# Patient Record
Sex: FEMALE | Race: WHITE | NOT HISPANIC OR LATINO | URBAN - METROPOLITAN AREA
[De-identification: names, ages, dates, MRNs, and addresses within clinical notes are randomized per-mention and may not be internally consistent; named-entity substitution may affect disease eponyms.]

---

## 2018-01-02 ENCOUNTER — EMERGENCY (EMERGENCY)
Facility: HOSPITAL | Age: 78
LOS: 1 days | Discharge: HOME | End: 2018-01-02
Attending: EMERGENCY MEDICINE | Admitting: EMERGENCY MEDICINE
Payer: MEDICARE

## 2018-01-02 VITALS
TEMPERATURE: 98 F | RESPIRATION RATE: 16 BRPM | OXYGEN SATURATION: 98 % | SYSTOLIC BLOOD PRESSURE: 137 MMHG | DIASTOLIC BLOOD PRESSURE: 66 MMHG | WEIGHT: 147.05 LBS | HEIGHT: 66 IN | HEART RATE: 65 BPM

## 2018-01-02 DIAGNOSIS — Z79.2 LONG TERM (CURRENT) USE OF ANTIBIOTICS: ICD-10-CM
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
## 2018-01-02 DIAGNOSIS — S51.851A OPEN BITE OF RIGHT FOREARM, INITIAL ENCOUNTER: ICD-10-CM

## 2018-01-02 DIAGNOSIS — L03.113 CELLULITIS OF RIGHT UPPER LIMB: ICD-10-CM

## 2018-01-02 PROCEDURE — 99284 EMERGENCY DEPT VISIT MOD MDM: CPT

## 2018-01-02 RX ORDER — TETANUS TOXOID, REDUCED DIPHTHERIA TOXOID AND ACELLULAR PERTUSSIS VACCINE, ADSORBED 5; 2.5; 8; 8; 2.5 [IU]/.5ML; [IU]/.5ML; UG/.5ML; UG/.5ML; UG/.5ML
0.5 SUSPENSION INTRAMUSCULAR ONCE
Qty: 0 | Refills: 0 | Status: COMPLETED | OUTPATIENT
Start: 2018-01-02 | End: 2018-01-02

## 2018-01-02 RX ADMIN — TETANUS TOXOID, REDUCED DIPHTHERIA TOXOID AND ACELLULAR PERTUSSIS VACCINE, ADSORBED 0.5 MILLILITER(S): 5; 2.5; 8; 8; 2.5 SUSPENSION INTRAMUSCULAR at 19:03

## 2018-01-02 RX ADMIN — Medication 1 TABLET(S): at 19:03

## 2018-01-02 NOTE — ED PROVIDER NOTE - MEDICAL DECISION MAKING DETAILS
bedside US shows no abscess, no fb noted, healthy dog, will update tdap, augmentin, instructed pt to return in 48 hr for repeat check and return precautions given

## 2018-01-02 NOTE — ED PROVIDER NOTE - OBJECTIVE STATEMENT
77 yof pw dog bite to R forearm 5 days ago, healthy dog (daughter's), tdap unknown, noted redness around bite site intermittently fluctuating in size but no fc.  no other complaints.

## 2018-01-02 NOTE — ED ADULT TRIAGE NOTE - CHIEF COMPLAINT QUOTE
Pt states she was bitten by her daughters dog 5 days ago to right upper arm, states the bite is red and swollen

## 2018-01-02 NOTE — ED PROVIDER NOTE - PHYSICAL EXAMINATION
CON: ao x 3, HENMT: clear oropharynx, soft neck, HEAD: atraumatic, SKIN: noted puncture wound to ulnar aspect of R forearm, w/ surrounding erythema w/o lymphatic streaking, MSK: no deformities, NEURO: no gross motor or sensory deficit CON: ao x 3, HENMT: clear oropharynx, soft neck, HEAD: atraumatic, SKIN: noted puncture wound to ulnar aspect of R forearm, w/ surrounding erythema w/o lymphatic streaking, no induration or fluctuance, no drainage or bleeding, MSK: no deformities, no crepitus, NEURO: no gross motor or sensory deficit

## 2018-08-14 ENCOUNTER — EMERGENCY (EMERGENCY)
Facility: HOSPITAL | Age: 78
LOS: 1 days | Discharge: HOME | End: 2018-08-14
Admitting: EMERGENCY MEDICINE
Payer: MEDICARE

## 2018-08-14 VITALS
HEART RATE: 67 BPM | RESPIRATION RATE: 18 BRPM | TEMPERATURE: 98 F | SYSTOLIC BLOOD PRESSURE: 128 MMHG | DIASTOLIC BLOOD PRESSURE: 73 MMHG | OXYGEN SATURATION: 99 %

## 2018-08-14 DIAGNOSIS — Z79.1 LONG TERM (CURRENT) USE OF NON-STEROIDAL ANTI-INFLAMMATORIES (NSAID): ICD-10-CM

## 2018-08-14 DIAGNOSIS — M25.511 PAIN IN RIGHT SHOULDER: ICD-10-CM

## 2018-08-14 PROCEDURE — 73010 X-RAY EXAM OF SHOULDER BLADE: CPT | Mod: 26,RT

## 2018-08-14 PROCEDURE — 99283 EMERGENCY DEPT VISIT LOW MDM: CPT

## 2018-08-14 PROCEDURE — 73030 X-RAY EXAM OF SHOULDER: CPT | Mod: 26,RT

## 2018-08-14 RX ORDER — METHOCARBAMOL 500 MG/1
1000 TABLET, FILM COATED ORAL ONCE
Qty: 0 | Refills: 0 | Status: COMPLETED | OUTPATIENT
Start: 2018-08-14 | End: 2018-08-14

## 2018-08-14 RX ORDER — METHOCARBAMOL 500 MG/1
2 TABLET, FILM COATED ORAL
Qty: 18 | Refills: 0 | OUTPATIENT
Start: 2018-08-14 | End: 2018-08-16

## 2018-08-14 RX ADMIN — METHOCARBAMOL 1000 MILLIGRAM(S): 500 TABLET, FILM COATED ORAL at 15:34

## 2018-08-14 NOTE — ED ADULT NURSE NOTE - NSIMPLEMENTINTERV_GEN_ALL_ED
Implemented All Universal Safety Interventions:  [place] to call system. Call bell, personal items and telephone within reach. Instruct patient to call for assistance. Room bathroom lighting operational. Non-slip footwear when patient is off stretcher. Physically safe environment: no spills, clutter or unnecessary equipment. Stretcher in lowest position, wheels locked, appropriate side rails in place.

## 2018-08-14 NOTE — ED PROVIDER NOTE - PROGRESS NOTE DETAILS
rechecked pt - discussed negative XR. pt feeling improved after robaxin. pt has sling at home to use for comfort.

## 2018-08-14 NOTE — ED PROVIDER NOTE - MEDICAL DECISION MAKING DETAILS
78yo F presents with right shoulder pain after a pulling injury. exam significant for some rhomboid muscle tenderness and spasm. no mehran tenderness. xr scapula and shoulder negative. pain improved with robaxin. will d/c on robaxin.

## 2018-08-14 NOTE — ED PROVIDER NOTE - MUSCULOSKELETAL, MLM
Spine appears normal, range of motion is not limited. point tenderness to right rhomboid muscle, no mehran scapular tenderness. normal ROM of entire upper extremity.

## 2018-08-14 NOTE — ED PROVIDER NOTE - OBJECTIVE STATEMENT
76yo F without any medical problems presents for right shoulder pain after pulling weeds out of a pond with a rake. pt describes pain as positional, denies any fall or trauma. denies any numbness, tingling, weakness. attempted to treat with massage but it was too painful. has also been taking ibuprofen and using a heating pad without consistent relief.

## 2018-09-01 NOTE — ED PROVIDER NOTE - NS ED ATTENDING NAME FT
Prep Survey      Responses   Facility patient discharged from?  [facility]   Is patient eligible?  Yes   Discharge diagnosis  pneumonia   Does the patient have one of the following disease processes/diagnoses(primary or secondary)?  COPD/Pneumonia   Does the patient have Home health ordered?  No   Is there a DME ordered?  No   Comments regarding appointments  patient to call office   Prep survey completed?  Yes          Brianna Chavez RN        
Yao

## 2018-09-05 ENCOUNTER — INPATIENT (INPATIENT)
Facility: HOSPITAL | Age: 78
LOS: 1 days | Discharge: HOME | DRG: 149 | End: 2018-09-07
Attending: PSYCHIATRY & NEUROLOGY | Admitting: PSYCHIATRY & NEUROLOGY
Payer: MEDICARE

## 2018-09-05 VITALS
DIASTOLIC BLOOD PRESSURE: 75 MMHG | HEART RATE: 75 BPM | RESPIRATION RATE: 18 BRPM | SYSTOLIC BLOOD PRESSURE: 168 MMHG | TEMPERATURE: 99 F | OXYGEN SATURATION: 99 %

## 2018-09-05 LAB
ALBUMIN SERPL ELPH-MCNC: 3.9 G/DL — SIGNIFICANT CHANGE UP (ref 3.4–5)
ALP SERPL-CCNC: 56 U/L — SIGNIFICANT CHANGE UP (ref 40–120)
ALT FLD-CCNC: 21 U/L — SIGNIFICANT CHANGE UP (ref 12–42)
AMPHET UR-MCNC: NEGATIVE — SIGNIFICANT CHANGE UP
ANION GAP SERPL CALC-SCNC: 11 MMOL/L — SIGNIFICANT CHANGE UP (ref 9–16)
APPEARANCE UR: CLEAR — SIGNIFICANT CHANGE UP
AST SERPL-CCNC: 22 U/L — SIGNIFICANT CHANGE UP (ref 15–37)
BACTERIA # UR AUTO: SIGNIFICANT CHANGE UP /HPF
BARBITURATES UR SCN-MCNC: NEGATIVE — SIGNIFICANT CHANGE UP
BENZODIAZ UR-MCNC: NEGATIVE — SIGNIFICANT CHANGE UP
BILIRUB SERPL-MCNC: 0.7 MG/DL — SIGNIFICANT CHANGE UP (ref 0.2–1.2)
BILIRUB UR-MCNC: NEGATIVE — SIGNIFICANT CHANGE UP
BUN SERPL-MCNC: 14 MG/DL — SIGNIFICANT CHANGE UP (ref 7–23)
CALCIUM SERPL-MCNC: 8.5 MG/DL — SIGNIFICANT CHANGE UP (ref 8.5–10.5)
CHLORIDE SERPL-SCNC: 103 MMOL/L — SIGNIFICANT CHANGE UP (ref 96–108)
CK MB BLD-MCNC: 2.31 % — SIGNIFICANT CHANGE UP
CK MB CFR SERPL CALC: 2.4 NG/ML — SIGNIFICANT CHANGE UP (ref 0.5–3.6)
CK SERPL-CCNC: 104 U/L — SIGNIFICANT CHANGE UP (ref 26–192)
CO2 SERPL-SCNC: 23 MMOL/L — SIGNIFICANT CHANGE UP (ref 22–31)
COCAINE METAB.OTHER UR-MCNC: NEGATIVE — SIGNIFICANT CHANGE UP
COLOR SPEC: YELLOW — SIGNIFICANT CHANGE UP
CREAT SERPL-MCNC: 0.62 MG/DL — SIGNIFICANT CHANGE UP (ref 0.5–1.3)
DIFF PNL FLD: ABNORMAL
EPI CELLS # UR: SIGNIFICANT CHANGE UP /HPF
ETHANOL SERPL-MCNC: <3 MG/DL — SIGNIFICANT CHANGE UP
GLUCOSE SERPL-MCNC: 128 MG/DL — HIGH (ref 70–99)
GLUCOSE UR QL: NEGATIVE — SIGNIFICANT CHANGE UP
HCT VFR BLD CALC: 41.4 % — SIGNIFICANT CHANGE UP (ref 34.5–45)
HGB BLD-MCNC: 14.3 G/DL — SIGNIFICANT CHANGE UP (ref 11.5–15.5)
KETONES UR-MCNC: 40 MG/DL
LEUKOCYTE ESTERASE UR-ACNC: NEGATIVE — SIGNIFICANT CHANGE UP
LIDOCAIN IGE QN: 83 U/L — SIGNIFICANT CHANGE UP (ref 73–393)
LYMPHOCYTES # BLD AUTO: 11 % — LOW (ref 13–44)
MAGNESIUM SERPL-MCNC: 2 MG/DL — SIGNIFICANT CHANGE UP (ref 1.6–2.6)
MCHC RBC-ENTMCNC: 31.6 PG — SIGNIFICANT CHANGE UP (ref 27–34)
MCHC RBC-ENTMCNC: 34.5 G/DL — SIGNIFICANT CHANGE UP (ref 32–36)
MCV RBC AUTO: 91.6 FL — SIGNIFICANT CHANGE UP (ref 80–100)
METHADONE UR-MCNC: NEGATIVE — SIGNIFICANT CHANGE UP
MONOCYTES NFR BLD AUTO: 2 % — SIGNIFICANT CHANGE UP (ref 2–14)
NEUTROPHILS NFR BLD AUTO: 80 % — HIGH (ref 43–77)
NEUTS BAND # BLD: 5 % — SIGNIFICANT CHANGE UP
NITRITE UR-MCNC: NEGATIVE — SIGNIFICANT CHANGE UP
OPIATES UR-MCNC: NEGATIVE — SIGNIFICANT CHANGE UP
PCP SPEC-MCNC: SIGNIFICANT CHANGE UP
PCP UR-MCNC: NEGATIVE — SIGNIFICANT CHANGE UP
PH UR: 6 — SIGNIFICANT CHANGE UP (ref 5–8)
PLAT MORPH BLD: NORMAL — SIGNIFICANT CHANGE UP
PLATELET # BLD AUTO: 202 K/UL — SIGNIFICANT CHANGE UP (ref 150–400)
POTASSIUM SERPL-MCNC: 3.8 MMOL/L — SIGNIFICANT CHANGE UP (ref 3.5–5.3)
POTASSIUM SERPL-SCNC: 3.8 MMOL/L — SIGNIFICANT CHANGE UP (ref 3.5–5.3)
PROT SERPL-MCNC: 7.5 G/DL — SIGNIFICANT CHANGE UP (ref 6.4–8.2)
PROT UR-MCNC: 30 MG/DL
RBC # BLD: 4.52 M/UL — SIGNIFICANT CHANGE UP (ref 3.8–5.2)
RBC # FLD: 12.1 % — SIGNIFICANT CHANGE UP (ref 10.3–16.9)
RBC BLD AUTO: NORMAL — SIGNIFICANT CHANGE UP
RBC CASTS # UR COMP ASSIST: < 5 /HPF — SIGNIFICANT CHANGE UP
SODIUM SERPL-SCNC: 137 MMOL/L — SIGNIFICANT CHANGE UP (ref 132–145)
SP GR SPEC: 1.02 — SIGNIFICANT CHANGE UP (ref 1–1.03)
THC UR QL: NEGATIVE — SIGNIFICANT CHANGE UP
TROPONIN I SERPL-MCNC: <0.017 NG/ML — LOW (ref 0.02–0.06)
UROBILINOGEN FLD QL: 0.2 E.U./DL — SIGNIFICANT CHANGE UP
VARIANT LYMPHS # BLD: 2 % — SIGNIFICANT CHANGE UP
WBC # BLD: 7.7 K/UL — SIGNIFICANT CHANGE UP (ref 3.8–10.5)
WBC # FLD AUTO: 7.7 K/UL — SIGNIFICANT CHANGE UP (ref 3.8–10.5)
WBC UR QL: < 5 /HPF — SIGNIFICANT CHANGE UP

## 2018-09-05 PROCEDURE — 70450 CT HEAD/BRAIN W/O DYE: CPT | Mod: 26

## 2018-09-05 PROCEDURE — 99285 EMERGENCY DEPT VISIT HI MDM: CPT | Mod: 25

## 2018-09-05 PROCEDURE — 93010 ELECTROCARDIOGRAM REPORT: CPT

## 2018-09-05 RX ORDER — DIAZEPAM 5 MG
5 TABLET ORAL ONCE
Qty: 0 | Refills: 0 | Status: DISCONTINUED | OUTPATIENT
Start: 2018-09-05 | End: 2018-09-05

## 2018-09-05 RX ORDER — SODIUM CHLORIDE 9 MG/ML
1000 INJECTION INTRAMUSCULAR; INTRAVENOUS; SUBCUTANEOUS ONCE
Qty: 0 | Refills: 0 | Status: COMPLETED | OUTPATIENT
Start: 2018-09-05 | End: 2018-09-05

## 2018-09-05 RX ORDER — FAMOTIDINE 10 MG/ML
20 INJECTION INTRAVENOUS ONCE
Qty: 0 | Refills: 0 | Status: COMPLETED | OUTPATIENT
Start: 2018-09-05 | End: 2018-09-05

## 2018-09-05 RX ORDER — MECLIZINE HCL 12.5 MG
25 TABLET ORAL ONCE
Qty: 0 | Refills: 0 | Status: COMPLETED | OUTPATIENT
Start: 2018-09-05 | End: 2018-09-05

## 2018-09-05 RX ORDER — MECLIZINE HCL 12.5 MG
50 TABLET ORAL ONCE
Qty: 0 | Refills: 0 | Status: COMPLETED | OUTPATIENT
Start: 2018-09-05 | End: 2018-09-05

## 2018-09-05 RX ORDER — ONDANSETRON 8 MG/1
4 TABLET, FILM COATED ORAL ONCE
Qty: 0 | Refills: 0 | Status: COMPLETED | OUTPATIENT
Start: 2018-09-05 | End: 2018-09-05

## 2018-09-05 RX ADMIN — Medication 25 MILLIGRAM(S): at 20:52

## 2018-09-05 RX ADMIN — Medication 5 MILLIGRAM(S): at 20:52

## 2018-09-05 RX ADMIN — Medication 50 MILLIGRAM(S): at 17:56

## 2018-09-05 RX ADMIN — ONDANSETRON 4 MILLIGRAM(S): 8 TABLET, FILM COATED ORAL at 17:56

## 2018-09-05 RX ADMIN — SODIUM CHLORIDE 2000 MILLILITER(S): 9 INJECTION INTRAMUSCULAR; INTRAVENOUS; SUBCUTANEOUS at 17:56

## 2018-09-05 RX ADMIN — FAMOTIDINE 20 MILLIGRAM(S): 10 INJECTION INTRAVENOUS at 17:56

## 2018-09-05 NOTE — ED ADULT NURSE NOTE - NSIMPLEMENTINTERV_GEN_ALL_ED
Implemented All Universal Safety Interventions:  [place] to call system. Call bell, personal items and telephone within reach. Instruct patient to call for assistance. Room bathroom lighting operational. Non-slip footwear when patient is off stretcher. Physically safe environment: no spills, clutter or unnecessary equipment. Stretcher in lowest position, wheels locked, appropriate side rails in place.

## 2018-09-05 NOTE — ED PROVIDER NOTE - PROGRESS NOTE DETAILS
improved symptoms, resolved vomiting but unsteady when gait attempted. will treat w more meclizine and will try valium and reassess. Pt still ataxic upon getting up, not improving, will admit for further work up of central dizziness, R/O cerebellar pathology. Discussed case w Dr Decker, accepts the case under Dr Britt.

## 2018-09-05 NOTE — ED ADULT NURSE REASSESSMENT NOTE - NS ED NURSE REASSESS COMMENT FT1
pt assisted to bathroom with wheelchair, reports dizziness when transferring to toilet, unsteady on feet, MD Pineda aware.
received pt from JESSIE Velez, pt resting in stretcher with daughter at bedside, reports improvement in dizziness.

## 2018-09-05 NOTE — ED PROVIDER NOTE - OBJECTIVE STATEMENT
77 y/o Female presents to the ED for a sudden dizziness sensation for approximately 14 hours PTA. Pt was sleeping and has been waking up throughout the night. Symptoms started abruptly, and feels better when she is not moving. But upon moving she feels dizzy and nauseated. She has vomited several times throughout the day. Denies CP, SOB, palpitations, headache, neck pain, fever, and chills. No similar prior episodes.

## 2018-09-05 NOTE — ED ADULT NURSE REASSESSMENT NOTE - NSIMPLEMENTINTERV_GEN_ALL_ED
Implemented All Fall Risk Interventions:  [place] to call system. Call bell, personal items and telephone within reach. Instruct patient to call for assistance. Room bathroom lighting operational. Non-slip footwear when patient is off stretcher. Physically safe environment: no spills, clutter or unnecessary equipment. Stretcher in lowest position, wheels locked, appropriate side rails in place. Provide visual cue, wrist band, yellow gown, etc. Monitor gait and stability. Monitor for mental status changes and reorient to person, place, and time. Review medications for side effects contributing to fall risk. Reinforce activity limits and safety measures with patient and family.

## 2018-09-05 NOTE — ED ADULT TRIAGE NOTE - CHIEF COMPLAINT QUOTE
pt. reports waking up with dizziness, nausea, and vomiting. Pt. unable to stand or move without loosing balance or feeling dizzy.

## 2018-09-05 NOTE — ED PROVIDER NOTE - MEDICAL DECISION MAKING DETAILS
Will tx as per vertigo but since this is patients first episode, will do blood work, CT head, hydrate, and reassess.

## 2018-09-06 DIAGNOSIS — Z29.9 ENCOUNTER FOR PROPHYLACTIC MEASURES, UNSPECIFIED: ICD-10-CM

## 2018-09-06 DIAGNOSIS — Z96.622 PRESENCE OF LEFT ARTIFICIAL ELBOW JOINT: Chronic | ICD-10-CM

## 2018-09-06 DIAGNOSIS — R63.8 OTHER SYMPTOMS AND SIGNS CONCERNING FOOD AND FLUID INTAKE: ICD-10-CM

## 2018-09-06 DIAGNOSIS — R42 DIZZINESS AND GIDDINESS: ICD-10-CM

## 2018-09-06 LAB
ANION GAP SERPL CALC-SCNC: 12 MMOL/L — SIGNIFICANT CHANGE UP (ref 5–17)
BUN SERPL-MCNC: 10 MG/DL — SIGNIFICANT CHANGE UP (ref 7–23)
CALCIUM SERPL-MCNC: 8.9 MG/DL — SIGNIFICANT CHANGE UP (ref 8.4–10.5)
CHLORIDE SERPL-SCNC: 103 MMOL/L — SIGNIFICANT CHANGE UP (ref 96–108)
CO2 SERPL-SCNC: 24 MMOL/L — SIGNIFICANT CHANGE UP (ref 22–31)
CREAT SERPL-MCNC: 0.56 MG/DL — SIGNIFICANT CHANGE UP (ref 0.5–1.3)
GLUCOSE SERPL-MCNC: 85 MG/DL — SIGNIFICANT CHANGE UP (ref 70–99)
HCT VFR BLD CALC: 40.4 % — SIGNIFICANT CHANGE UP (ref 34.5–45)
HGB BLD-MCNC: 13.6 G/DL — SIGNIFICANT CHANGE UP (ref 11.5–15.5)
MAGNESIUM SERPL-MCNC: 2 MG/DL — SIGNIFICANT CHANGE UP (ref 1.6–2.6)
MCHC RBC-ENTMCNC: 31.4 PG — SIGNIFICANT CHANGE UP (ref 27–34)
MCHC RBC-ENTMCNC: 33.7 G/DL — SIGNIFICANT CHANGE UP (ref 32–36)
MCV RBC AUTO: 93.3 FL — SIGNIFICANT CHANGE UP (ref 80–100)
PLATELET # BLD AUTO: 192 K/UL — SIGNIFICANT CHANGE UP (ref 150–400)
POTASSIUM SERPL-MCNC: 3.2 MMOL/L — LOW (ref 3.5–5.3)
POTASSIUM SERPL-SCNC: 3.2 MMOL/L — LOW (ref 3.5–5.3)
RBC # BLD: 4.33 M/UL — SIGNIFICANT CHANGE UP (ref 3.8–5.2)
RBC # FLD: 12.5 % — SIGNIFICANT CHANGE UP (ref 10.3–16.9)
SODIUM SERPL-SCNC: 139 MMOL/L — SIGNIFICANT CHANGE UP (ref 135–145)
WBC # BLD: 7.2 K/UL — SIGNIFICANT CHANGE UP (ref 3.8–10.5)
WBC # FLD AUTO: 7.2 K/UL — SIGNIFICANT CHANGE UP (ref 3.8–10.5)

## 2018-09-06 PROCEDURE — 99223 1ST HOSP IP/OBS HIGH 75: CPT

## 2018-09-06 PROCEDURE — 93010 ELECTROCARDIOGRAM REPORT: CPT

## 2018-09-06 RX ORDER — SODIUM CHLORIDE 9 MG/ML
1000 INJECTION INTRAMUSCULAR; INTRAVENOUS; SUBCUTANEOUS
Qty: 0 | Refills: 0 | Status: DISCONTINUED | OUTPATIENT
Start: 2018-09-06 | End: 2018-09-07

## 2018-09-06 RX ORDER — MECLIZINE HCL 12.5 MG
25 TABLET ORAL EVERY 6 HOURS
Qty: 0 | Refills: 0 | Status: DISCONTINUED | OUTPATIENT
Start: 2018-09-06 | End: 2018-09-07

## 2018-09-06 RX ORDER — INFLUENZA VIRUS VACCINE 15; 15; 15; 15 UG/.5ML; UG/.5ML; UG/.5ML; UG/.5ML
0.5 SUSPENSION INTRAMUSCULAR ONCE
Qty: 0 | Refills: 0 | Status: COMPLETED | OUTPATIENT
Start: 2018-09-06 | End: 2018-09-07

## 2018-09-06 RX ORDER — ASPIRIN/CALCIUM CARB/MAGNESIUM 324 MG
325 TABLET ORAL ONCE
Qty: 0 | Refills: 0 | Status: COMPLETED | OUTPATIENT
Start: 2018-09-06 | End: 2018-09-06

## 2018-09-06 RX ORDER — ONDANSETRON 8 MG/1
4 TABLET, FILM COATED ORAL EVERY 8 HOURS
Qty: 0 | Refills: 0 | Status: DISCONTINUED | OUTPATIENT
Start: 2018-09-06 | End: 2018-09-07

## 2018-09-06 RX ORDER — ASPIRIN/CALCIUM CARB/MAGNESIUM 324 MG
81 TABLET ORAL DAILY
Qty: 0 | Refills: 0 | Status: DISCONTINUED | OUTPATIENT
Start: 2018-09-07 | End: 2018-09-07

## 2018-09-06 RX ORDER — INFLUENZA VIRUS VACCINE 15; 15; 15; 15 UG/.5ML; UG/.5ML; UG/.5ML; UG/.5ML
0.5 SUSPENSION INTRAMUSCULAR ONCE
Qty: 0 | Refills: 0 | Status: DISCONTINUED | OUTPATIENT
Start: 2018-09-06 | End: 2018-09-06

## 2018-09-06 RX ORDER — ATORVASTATIN CALCIUM 80 MG/1
40 TABLET, FILM COATED ORAL AT BEDTIME
Qty: 0 | Refills: 0 | Status: DISCONTINUED | OUTPATIENT
Start: 2018-09-06 | End: 2018-09-07

## 2018-09-06 RX ADMIN — Medication 325 MILLIGRAM(S): at 02:51

## 2018-09-06 RX ADMIN — SODIUM CHLORIDE 100 MILLILITER(S): 9 INJECTION INTRAMUSCULAR; INTRAVENOUS; SUBCUTANEOUS at 02:51

## 2018-09-06 RX ADMIN — ATORVASTATIN CALCIUM 40 MILLIGRAM(S): 80 TABLET, FILM COATED ORAL at 22:10

## 2018-09-06 NOTE — H&P ADULT - PROBLEM SELECTOR PLAN 1
Likely positional vertigo. Cannot rule out central vestibular lesion given symptoms of ataxic gait and vomiting, however, less likely given that pt without headache, double vision, visual loss, slurred speech, or other cranial nerve abnormalities, motor or sensory deficits, dysmetria, or abnormal reflexes. Unlikely postural vertigo as symptoms not provoked by changes in BP. CTH negative.  - ordered for MRI and MRA head w/o contrast  - s/p ASA 325mg x1 and started on 81mg daily  - started on atorvastatin 40mg qhs  - NS at 100cc/hr  - Zofran 4mg q8hrs PRN nausea  - Meclizine 25mg q6hrs PRN dizziness  - fall precautions  - monitor neuro status per protocol

## 2018-09-06 NOTE — H&P ADULT - NSHPSOCIALHISTORY_GEN_ALL_CORE
Patient denies tobacco and ETOH use. Occasional ETOH use.   Previous smoker, 12 pack years, quit 15 years ago.  Retired.

## 2018-09-06 NOTE — H&P ADULT - NSHPPHYSICALEXAM_GEN_ALL_CORE
.  VITAL SIGNS:  T(F): 98 (09-06-18 @ 00:20), Max: 98.8 (09-05-18 @ 17:03)  HR: 64 (09-06-18 @ 01:07) (64 - 76)  BP: 147/66 (09-06-18 @ 01:07) (129/71 - 168/75)  BP(mean): 95 (09-06-18 @ 01:07) (95 - 95)  RR: 16 (09-06-18 @ 01:07) (16 - 18)  SpO2: 100% (09-06-18 @ 01:07) (98% - 100%)    PHYSICAL EXAM:  Constitutional: WDWN resting comfortably in bed; NAD  HEENT: NC/AT, PERRL, EOMI, anicteric sclera, no nasal discharge; uvula midline, no oropharyngeal erythema or exudates; MMM  Neck: supple; no JVD or thyromegaly  Respiratory: CTA B/L; no W/R/R, no retractions  Cardiac: +S1/S2; RRR; no M/R/G; PMI non-displaced  Gastrointestinal: soft, NT/ND; no rebound or guarding; +BSx4  Back: spine midline, no bony tenderness or step-offs; no CVAT B/L  Extremities: WWP, no clubbing or cyanosis; no peripheral edema  Musculoskeletal: NROM x4; no joint swelling, tenderness or erythema  Vascular: 2+ radial, femoral, DP/PT pulses B/L  Dermatologic: skin warm, dry and intact; no rashes, wounds, or scars  Lymphatic: no submandibular or cervical LAD  Neurologic: AAOx3; CNII-XII grossly intact; no focal deficits  Psychiatric: affect and characteristics of appearance, verbalizations, behaviors are appropriate, denies SI/HI/AH/VH .  VITAL SIGNS:  T(F): 98 (09-06-18 @ 00:20), Max: 98.8 (09-05-18 @ 17:03)  HR: 64 (09-06-18 @ 01:07) (64 - 76)  BP: 147/66 (09-06-18 @ 01:07) (129/71 - 168/75)  BP(mean): 95 (09-06-18 @ 01:07) (95 - 95)  RR: 16 (09-06-18 @ 01:07) (16 - 18)  SpO2: 100% (09-06-18 @ 01:07) (98% - 100%)    PHYSICAL EXAM:  Constitutional: WDWN, resting comfortably in bed, NAD  HEENT: NC/AT, PERRL, EOMI, no oropharyngeal erythema or exudates; MMM  Neck: supple  Respiratory: CTA B/L  Cardiac: +S1/S2; RRR; no M/R/G  Gastrointestinal: soft, NT/ND; no rebound or guarding; +BSx4  Extremities: L thigh with large ecchymosis and palpable hematoma  skin: no rashes  Neurologic:   - Mental Status:  AAOx3; speech is fluent with intact naming, repetition, and comprehension; Good overall fund of knowledge.  - Cranial Nerves II-XII:  PERRL, EOMI with left horizontal nystagmus, Facial sensation is intact in the V1-V3 distribution bilaterally,  Face is symmetric with normal eye closure and smile, Hearing is intact to finger rub, Uvula is midline and soft palate rises symmetrically, Head turning and shoulder shrug are intact, Tongue protrudes in the midline.  - Motor:  Strength is 5/5 throughout.  There is no pronator drift.  Normal muscle bulk and tone throughout.  - Reflexes:  2+ and symmetric throughout.  - Sensory:  Intact to light touch, and joint-position sense throughout.  - Coordination:  Finger-nose-finger and heel-knee-shin intact without dysmetria.  Rapid alternating hand movements intact.  - Gait:   deferred

## 2018-09-06 NOTE — H&P ADULT - PMH
Malignant neoplasm of female breast, unspecified estrogen receptor status, unspecified laterality, unspecified site of breast  In remission

## 2018-09-06 NOTE — H&P ADULT - NSHPLABSRESULTS_GEN_ALL_CORE
.  LABS:                         14.3   7.7   )-----------( 202      ( 05 Sep 2018 18:28 )             41.4         137  |  103  |  14  ----------------------------<  128<H>  3.8   |  23  |  0.62    Ca    8.5      05 Sep 2018 18:28  Mg     2.0         TPro  7.5  /  Alb  3.9  /  TBili  0.7  /  DBili  x   /  AST  22  /  ALT  21  /  AlkPhos  56        Urinalysis Basic - ( 05 Sep 2018 20:51 )  Color: Yellow / Appearance: Clear / S.025 / pH: x  Gluc: x / Ketone: 40 mg/dL  / Bili: NEGATIVE / Urobili: 0.2 E.U./dL   Blood: x / Protein: 30 mg/dL / Nitrite: NEGATIVE   Leuk Esterase: NEGATIVE / RBC: < 5 /HPF / WBC < 5 /HPF   Sq Epi: x / Non Sq Epi: Rare /HPF / Bacteria: Occasional /HPF      CARDIAC MARKERS ( 05 Sep 2018 18:28 )  <0.017 ng/mL / x     / 104 U/L / x     / 2.4 ng/mL        RADIOLOGY, EKG & ADDITIONAL TESTS: Reviewed. .  LABS:                         14.3   7.7   )-----------( 202      ( 05 Sep 2018 18:28 )             41.4     09-05    137  |  103  |  14  ----------------------------<  128<H>  3.8   |  23  |  0.62    Ca    8.5      05 Sep 2018 18:28  Mg     2.0     09-05    TPro  7.5  /  Alb  3.9  /  TBili  0.7  /  DBili  x   /  AST  22  /  ALT  21  /  AlkPhos  56  09-05      CARDIAC MARKERS ( 05 Sep 2018 18:28 )  <0.017 ng/mL / x     / 104 U/L / x     / 2.4 ng/mL        RADIOLOGY, EKG & ADDITIONAL TESTS: Reviewed.    CT Head No Cont (09.05.18 @ 18:06)     IMPRESSION: Minimal microvascular disease.  No acute intracranial hemorrhage, transcortical infarction, or mass   effect.

## 2018-09-06 NOTE — H&P ADULT - HISTORY OF PRESENT ILLNESS
78F with no significant PMH who presents to the [facility] ED from [facility] for 1 day history of dizziness. Dizziness started abruptly in the morning and is associated with nausea and vomiting. It is exacerbated by moving and alleviated by staying still. She denies CP, SOB, palpitations, headache, neck pain, fever, and chills. No similar prior episodes.    In [facility], vitals were T 98.8, HR 75, /75, RR 18, SpO2 98% room air. No significant abnormal labs. CTH with minimal microvascular disease, but with was negative for infarction, bleed or mass effect. Patient given meclizine 75mg total, Pepcid 20mg IV, Valium 5mg, Zofran 4mg and 1L NS bolus. Patient transferred to [facility] tele for r/o vestibular lesion. 78F with PMH of breast cancer s/p lumpectomy and RTx (now in remission) who presents from [facility] for 1 day history of dizziness. Dizziness started abruptly at 3am on 9/5 when patient awoke and sat up in bed with sudden urge to urinate. Dizziness was exacerbated by head movement and alleviated by resting and staying still. Described as feeling wobbly. It was associated with nausea and vomiting with approximately 20 episodes of NBNB vomiting throughout the day. Patient admits to eating rotisserie chicken that sat in the hot car for >1hr the day prior and 3 glasses of wine (more than normal). She denies associated CP, SOB, palpitations, headache, neck pain, fever, chills, hearing loss, ear pain, difficulty speaking, or swallowing. No similar prior episodes. Patient admits to minor trauma 1 week prior where she sustained large R thigh hematoma after mechanical fall while hiking, however, she denies head trauma at that time. Patient states that her symptoms are almost completely resolved after arrival at [facility].     In [facility], vitals were T 98.8, HR 75, /75, RR 18, SpO2 98% room air. No significant abnormal labs. CTH with minimal microvascular disease, but with was negative for infarction, bleed or mass effect. Patient given meclizine 75mg total, Pepcid 20mg IV, Valium 5mg, Zofran 4mg and 1L NS bolus. Patient transferred to [facility] for r/o vestibular lesion.

## 2018-09-06 NOTE — H&P ADULT - ATTENDING COMMENTS
Patient seen and examined.  Agree with above.  Patient had dizziness with vomiting that was worse with movement.  She didn't feel comfortable moving / walking in her house due to dizziness and repeated vomiting.  No specific weakness or numbness.  It started two nights ago.  She thinks that she did improve with the meds given to her at [facility] but still wasn't steady enough to go home.  She feels much better today but not fully back to baseline.      Neurological exam intact without nystagmus or dysmetria.    Overall seems more compatible with peripheral vertigo; lower suspicion for TIA/CVA.    Awaiting rest of workup including MRI Brain, PT eval.

## 2018-09-07 ENCOUNTER — TRANSCRIPTION ENCOUNTER (OUTPATIENT)
Age: 78
End: 2018-09-07

## 2018-09-07 VITALS — TEMPERATURE: 98 F

## 2018-09-07 PROCEDURE — 36415 COLL VENOUS BLD VENIPUNCTURE: CPT

## 2018-09-07 PROCEDURE — 82550 ASSAY OF CK (CPK): CPT

## 2018-09-07 PROCEDURE — 83735 ASSAY OF MAGNESIUM: CPT

## 2018-09-07 PROCEDURE — 99238 HOSP IP/OBS DSCHRG MGMT 30/<: CPT

## 2018-09-07 PROCEDURE — 85025 COMPLETE CBC W/AUTO DIFF WBC: CPT

## 2018-09-07 PROCEDURE — 84484 ASSAY OF TROPONIN QUANT: CPT

## 2018-09-07 PROCEDURE — 85027 COMPLETE CBC AUTOMATED: CPT

## 2018-09-07 PROCEDURE — 93005 ELECTROCARDIOGRAM TRACING: CPT

## 2018-09-07 PROCEDURE — 80307 DRUG TEST PRSMV CHEM ANLYZR: CPT

## 2018-09-07 PROCEDURE — 90662 IIV NO PRSV INCREASED AG IM: CPT

## 2018-09-07 PROCEDURE — 81001 URINALYSIS AUTO W/SCOPE: CPT

## 2018-09-07 PROCEDURE — 83690 ASSAY OF LIPASE: CPT

## 2018-09-07 PROCEDURE — 96374 THER/PROPH/DIAG INJ IV PUSH: CPT

## 2018-09-07 PROCEDURE — 80053 COMPREHEN METABOLIC PANEL: CPT

## 2018-09-07 PROCEDURE — 82962 GLUCOSE BLOOD TEST: CPT

## 2018-09-07 PROCEDURE — 82553 CREATINE MB FRACTION: CPT

## 2018-09-07 PROCEDURE — 99285 EMERGENCY DEPT VISIT HI MDM: CPT | Mod: 25

## 2018-09-07 PROCEDURE — 80048 BASIC METABOLIC PNL TOTAL CA: CPT

## 2018-09-07 PROCEDURE — 70450 CT HEAD/BRAIN W/O DYE: CPT

## 2018-09-07 PROCEDURE — 96375 TX/PRO/DX INJ NEW DRUG ADDON: CPT

## 2018-09-07 PROCEDURE — 97161 PT EVAL LOW COMPLEX 20 MIN: CPT

## 2018-09-07 RX ORDER — ATORVASTATIN CALCIUM 80 MG/1
1 TABLET, FILM COATED ORAL
Qty: 30 | Refills: 0 | OUTPATIENT
Start: 2018-09-07 | End: 2018-10-06

## 2018-09-07 RX ORDER — ASPIRIN/CALCIUM CARB/MAGNESIUM 324 MG
1 TABLET ORAL
Qty: 30 | Refills: 0 | OUTPATIENT
Start: 2018-09-07 | End: 2018-10-06

## 2018-09-07 RX ORDER — MECLIZINE HCL 12.5 MG
1 TABLET ORAL
Qty: 80 | Refills: 0 | OUTPATIENT
Start: 2018-09-07 | End: 2018-09-26

## 2018-09-07 RX ADMIN — INFLUENZA VIRUS VACCINE 0.5 MILLILITER(S): 15; 15; 15; 15 SUSPENSION INTRAMUSCULAR at 16:51

## 2018-09-07 RX ADMIN — Medication 25 MILLIGRAM(S): at 10:36

## 2018-09-07 RX ADMIN — Medication 81 MILLIGRAM(S): at 11:49

## 2018-09-07 NOTE — DISCHARGE NOTE ADULT - PLAN OF CARE
Limit disease progression and symptom development You were noted to have dizziness that was exacerbated by head movement and alleviated by resting and staying still, with associated nausea and vomiting. Please continue taking meclizine only as needed for symptom relief. Please follow-up with Dr. Britt at your convenience in her office.

## 2018-09-07 NOTE — DISCHARGE NOTE ADULT - FINDINGS/TREATMENT
FINDINGS: There are patchy areas of low density within the white matter that are nonspecific and likely consistent with minimal to mild microvascular disease in a patient of this age.There is mild enlargement of the sulci and cisterns consistent with mild to moderate generalized parenchymal loss predominantly rontal. There is no evidence of hydrocephalus.  The gray-white matter differentiation is preserved without evidence of recent transcortical infarction. No acute intracranial hemorrhage, mass effect or extra-axial fluid collections.    The calvarium is intact. The visualized paranasal sinuses and mastoid air cells are clear.  IMPRESSION:Minimal microvascular disease.    No acute intracranial hemorrhage, transcortical infarction, or mass   effect.

## 2018-09-07 NOTE — DISCHARGE NOTE ADULT - PATIENT PORTAL LINK FT
You can access the [email or web address], offered by [facility], by registering with the following website: [email or web address]

## 2018-09-07 NOTE — PHYSICAL THERAPY INITIAL EVALUATION ADULT - PERTINENT HX OF CURRENT PROBLEM, REHAB EVAL
Pt. is a 78 y.o. female admitted with dizziness, gait ataxia, 20 episodes of vomiting, now resolved. of note, pt. had three glasses of vine and chicken that was exposed to heat for two hours in a car the night before onset of symptoms at 3am.

## 2018-09-07 NOTE — PHYSICAL THERAPY INITIAL EVALUATION ADULT - PREDICTED DURATION OF THERAPY (DAYS/WKS), PT EVAL
Pt. would benefit from further gait/balance practice as pt. does not feel back to baseline even though no loss of balance or veering were noted and no symptoms were induced by positional changes.

## 2018-09-07 NOTE — PHYSICAL THERAPY INITIAL EVALUATION ADULT - MODALITIES TREATMENT COMMENTS
Facial symmetry intact, speech fluent, no visual deficits, horizontal unidirectional nystagmus x2 out of 7 repetitions with 3-4 beats to the L.

## 2018-09-07 NOTE — DISCHARGE NOTE ADULT - HOSPITAL COURSE
78F with PMH of breast cancer s/p lumpectomy and RTx (now in remission) who presents from [facility] for 1 day history of dizziness. Dizziness started abruptly at 3am on 9/5 when patient awoke and sat up in bed with sudden urge to urinate. Dizziness was exacerbated by head movement and alleviated by resting and staying still, w/ associated n/v, with approx 20 episodes of NBNB vomiting. In [facility], vitals were T 98.8, HR 75, /75, RR 18, SpO2 98% room air. No significant abnormal labs. CTH with minimal microvascular disease, but with was negative for infarction, bleed or mass effect. Patient given meclizine 75mg total, Pepcid 20mg IV, Valium 5mg, Zofran 4mg and 1L NS bolus. Patient transferred to [facility] tele for r/o vestibular lesion. Nausea improved w/ zofran providing relief. Patient dizziness improved with meclizine PRN. Deemed stable for discharge to home for with follow-up MRI as outpatient. 78F with PMH of breast cancer s/p lumpectomy and RTx (now in remission) who presents from [facility] for 1 day history of dizziness. Dizziness started abruptly at 3am on 9/5 when patient awoke and sat up in bed with sudden urge to urinate. Dizziness was exacerbated by head movement and alleviated by resting and staying still, w/ associated n/v, with approx 20 episodes of NBNB vomiting. In [facility], vitals were T 98.8, HR 75, /75, RR 18, SpO2 98% room air. No significant abnormal labs. CTH with minimal microvascular disease, but with was negative for infarction, bleed or mass effect. Patient given meclizine 75mg total, Pepcid 20mg IV, Valium 5mg, Zofran 4mg and 1L NS bolus. Patient transferred to [facility] tele for r/o vestibular lesion. Nausea improved w/ zofran providing relief. Patient dizziness improved with meclizine PRN. Deemed stable for discharge to home for with follow-up MRI as outpatient.  She should otherwise return to her home medications.

## 2018-09-07 NOTE — DIETITIAN INITIAL EVALUATION ADULT. - ENERGY NEEDS
Height 67"; #; #; 107%IBW  BMI 22.7  ABW used for calculations as pt between % of IBW. Needs estimated for maintenance in older adults

## 2018-09-07 NOTE — DISCHARGE NOTE ADULT - ADDITIONAL INSTRUCTIONS
Please follow-up with Dr. Britt as outpatient for your MRI, and she will set you up with vestibular rehabilitation for your vertigo/dizziness.

## 2018-09-07 NOTE — DISCHARGE NOTE ADULT - NS AS ACTIVITY OBS
Please advance your activity as tolerated./Walking-Outdoors allowed/Walking-Indoors allowed/Bathing allowed/Showering allowed

## 2018-09-07 NOTE — DISCHARGE NOTE ADULT - CARE PLAN
Principal Discharge DX:	Vertigo  Goal:	Limit disease progression and symptom development  Assessment and plan of treatment:	You were noted to have dizziness that was exacerbated by head movement and alleviated by resting and staying still, with associated nausea and vomiting. Please continue taking meclizine only as needed for symptom relief. Please follow-up with Dr. Britt at your convenience in her office.

## 2018-09-07 NOTE — DISCHARGE NOTE ADULT - CARE PROVIDER_API CALL
Solange Britt), Neurology; Vascular Neurology  [address], [place] [address]  Phone: [phone]  Fax: [phone]

## 2018-09-07 NOTE — PROGRESS NOTE ADULT - SUBJECTIVE AND OBJECTIVE BOX
INTERVAL HPI/OVERNIGHT EVENTS:  Patient was seen and examined at bedside. As per nurse and patient, no o/n events, patient resting comfortably. No complaints at this time. Patient denies: fever, chills, dizziness, weakness, HA, Changes in vision, CP, palpitations, SOB, cough, N/V/D/C, dysuria, changes in bowel movements, LE edema. ROS otherwise negative.    ICU Vital Signs Last 24 Hrs  T(C): 36.5 (07 Sep 2018 05:48), Max: 37.3 (06 Sep 2018 20:10)  T(F): 97.7 (07 Sep 2018 05:48), Max: 99.2 (06 Sep 2018 20:10)  HR: 56 (06 Sep 2018 20:17) (56 - 66)  BP: 130/62 (06 Sep 2018 20:17) (106/53 - 130/62)  BP(mean): 89 (06 Sep 2018 20:17) (76 - 93)  RR: 16 (06 Sep 2018 20:17) (16 - 16)  SpO2: 99% (06 Sep 2018 20:17) (98% - 99%)      18 @ 07:01  -  18 @ 07:00  --------------------------------------------------------  IN: 1020 mL / OUT: 700 mL / NET: 320 mL    PHYSICAL EXAM:    Constitutional: WDWN, NAD  HEENT: PERRL, EOMI, sclera non-icteric, neck supple, trachea midline, no masses, no JVD, MMM, good dentition  Respiratory: CTA b/l, good air entry b/l, no wheezing, no rhonchi, no rales, without accessory muscle use and no intercostal retractions  Cardiovascular: RRR, normal S1S2, no M/R/G  Gastrointestinal: soft, NTND, no masses palpable, BS normal  Extremities: Warm, well perfused, pulses equal bilateral upper and lower extremities, no edema, no clubbing  Neurological: AAOx3, CN Grossly intact  Skin: Normal temperature, warm, dry    MEDICATIONS  (STANDING):  aspirin  chewable 81 milliGRAM(s) Oral daily  atorvastatin 40 milliGRAM(s) Oral at bedtime  influenza  Vaccine (HIGH DOSE) 0.5 milliLiter(s) IntraMuscular once  sodium chloride 0.9%. 1000 milliLiter(s) (100 mL/Hr) IV Continuous <Continuous>    MEDICATIONS  (PRN):  meclizine 25 milliGRAM(s) Oral every 6 hours PRN Dizziness  ondansetron    Tablet 4 milliGRAM(s) Oral every 8 hours PRN Nausea and/or Vomiting      Allergies    No Known Allergies    Intolerances        LABS:                        13.6   7.2   )-----------( 192      ( 06 Sep 2018 06:18 )             40.4     -    139  |  103  |  10  ----------------------------<  85  3.2<L>   |  24  |  0.56    Ca    8.9      06 Sep 2018 06:18  Mg     2.0         TPro  7.5  /  Alb  3.9  /  TBili  0.7  /  DBili  x   /  AST  22  /  ALT  21  /  AlkPhos  56        Urinalysis Basic - ( 05 Sep 2018 20:51 )    Color: Yellow / Appearance: Clear / S.025 / pH: x  Gluc: x / Ketone: 40 mg/dL  / Bili: NEGATIVE / Urobili: 0.2 E.U./dL   Blood: x / Protein: 30 mg/dL / Nitrite: NEGATIVE   Leuk Esterase: NEGATIVE / RBC: < 5 /HPF / WBC < 5 /HPF   Sq Epi: x / Non Sq Epi: Rare /HPF / Bacteria: Occasional /HPF        RADIOLOGY & ADDITIONAL TESTS:  Reviewed

## 2018-09-07 NOTE — CONSULT NOTE ADULT - ASSESSMENT
78F with no significant PMH who presents to the [facility] ED from [facility] for 1 day history of dizziness.    Problem/Plan - 1:  ·  Problem: Dizziness.  Plan: Likely positional vertigo. Cannot rule out central vestibular lesion given symptoms of ataxic gait and vomiting, however, less likely given that pt without headache, double vision, visual loss, slurred speech, or other cranial nerve abnormalities, motor or sensory deficits, dysmetria, or abnormal reflexes. Unlikely postural vertigo as symptoms not provoked by changes in BP. CTH negative.  - ordered for MRI and MRA head w/o contrast  - s/p ASA 325mg x1 and started on 81mg daily  - started on atorvastatin 40mg qhs  - NS at 100cc/hr  - Zofran 4mg q8hrs PRN nausea  - Meclizine 25mg q6hrs PRN dizziness  - fall precautions  - monitor neuro status per protocol.

## 2018-09-07 NOTE — DISCHARGE NOTE ADULT - MEDICATION SUMMARY - MEDICATIONS TO TAKE
I will START or STAY ON the medications listed below when I get home from the hospital:    aspirin 81 mg oral tablet, chewable  -- 1 tab(s) by mouth once a day  -- Indication: For Prophylactic measure    meclizine 25 mg oral tablet  -- 1 tab(s) by mouth every 6 hours, As needed, Dizziness  -- Indication: For Dizziness    atorvastatin 40 mg oral tablet  -- 1 tab(s) by mouth once a day (at bedtime)  -- Indication: For Prophylactic measure

## 2018-09-07 NOTE — CONSULT NOTE ADULT - SUBJECTIVE AND OBJECTIVE BOX
Patient is a 78y old  Female who presents with a chief complaint of dizziness, r/o vestibular lesion (07 Sep 2018 07:04)       HPI:  78F with PMH of breast cancer s/p lumpectomy and RTx (now in remission) who presents from [facility] for 1 day history of dizziness. Dizziness started abruptly at 3am on  when patient awoke and sat up in bed with sudden urge to urinate. Dizziness was exacerbated by head movement and alleviated by resting and staying still. Described as feeling wobbly. It was associated with nausea and vomiting with approximately 20 episodes of NBNB vomiting throughout the day. Patient admits to eating rotisserie chicken that sat in the hot car for >1hr the day prior and 3 glasses of wine (more than normal). She denies associated CP, SOB, palpitations, headache, neck pain, fever, chills, hearing loss, ear pain, difficulty speaking, or swallowing. No similar prior episodes. Patient admits to minor trauma 1 week prior where she sustained large R thigh hematoma after mechanical fall while hiking, however, she denies head trauma at that time. Patient states that her symptoms are almost completely resolved after arrival at [facility].     In [facility], vitals were T 98.8, HR 75, /75, RR 18, SpO2 98% room air. No significant abnormal labs. CTH with minimal microvascular disease, but with was negative for infarction, bleed or mass effect. Patient given meclizine 75mg total, Pepcid 20mg IV, Valium 5mg, Zofran 4mg and 1L NS bolus. Patient transferred to [facility] tele for r/o vestibular lesion. (06 Sep 2018 01:34)      PAST MEDICAL & SURGICAL HISTORY:  Malignant neoplasm of female breast, unspecified estrogen receptor status, unspecified laterality, unspecified site of breast: In remission  Presence of artificial elbow joint, left      MEDICATIONS  (STANDING):  aspirin  chewable 81 milliGRAM(s) Oral daily  atorvastatin 40 milliGRAM(s) Oral at bedtime  influenza  Vaccine (HIGH DOSE) 0.5 milliLiter(s) IntraMuscular once  sodium chloride 0.9%. 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    Functional Level Prior to Admission per patient: ADL independent, walks without assistive devices    FAMILY HISTORY:  No pertinent family history in first degree relatives      CBC Full  -  ( 06 Sep 2018 06:18 )  WBC Count : 7.2 K/uL  Hemoglobin : 13.6 g/dL  Hematocrit : 40.4 %  Platelet Count - Automated : 192 K/uL  Mean Cell Volume : 93.3 fL  Mean Cell Hemoglobin : 31.4 pg  Mean Cell Hemoglobin Concentration : 33.7 g/dL  Auto Neutrophil # : x  Auto Lymphocyte # : x  Auto Monocyte # : x  Auto Eosinophil # : x  Auto Basophil # : x  Auto Neutrophil % : x  Auto Lymphocyte % : x  Auto Monocyte % : x  Auto Eosinophil % : x  Auto Basophil % : x          139  |  103  |  10  ----------------------------<  85  3.2<L>   |  24  |  0.56    Ca    8.9      06 Sep 2018 06:18  Mg     2.0         TPro  7.5  /  Alb  3.9  /  TBili  0.7  /  DBili  x   /  AST  22  /  ALT  21  /  AlkPhos  56        Urinalysis Basic - ( 05 Sep 2018 20:51 )    Color: Yellow / Appearance: Clear / S.025 / pH: x  Gluc: x / Ketone: 40 mg/dL  / Bili: NEGATIVE / Urobili: 0.2 E.U./dL   Blood: x / Protein: 30 mg/dL / Nitrite: NEGATIVE   Leuk Esterase: NEGATIVE / RBC: < 5 /HPF / WBC < 5 /HPF   Sq Epi: x / Non Sq Epi: Rare /HPF / Bacteria: Occasional /HPF          Radiology:    < from: CT Head No Cont (18 @ 18:06) >    EXAM:  CT BRAIN                           PROCEDURE DATE:  2018          INTERPRETATION:  HEAD CT WITHOUT CONTRAST dated 2018 6:06 PM     HISTORY: Dizziness.     TECHNIQUE: Head CT was performed without intravenous contrast. Axial,   sagittal, and coronal images were obtained.    COMPARISON: None.     FINDINGS: There are patchy areas of low density within the white matter   that are nonspecific and likely consistent with minimal to mild   microvascular disease in a patient of this age.    There is mild enlargement of the sulci and cisterns consistent with mild   to moderate generalized parenchymal loss predominantly frontal. There is   no evidence of hydrocephalus.    The gray-white matter differentiation is preserved without evidence of   recent transcortical infarction. No acute intracranial hemorrhage, mass   effect or extra-axial fluid collections.      The calvarium is intact. The visualized paranasal sinuses and mastoid air   cells are clear.    IMPRESSION:Minimal microvascular disease.    No acute intracranial hemorrhage, transcortical infarction, or mass   effect.                   Vital Signs Last 24 Hrs  T(C): 36.5 (07 Sep 2018 05:48), Max: 37.3 (06 Sep 2018 20:10)  T(F): 97.7 (07 Sep 2018 05:48), Max: 99.2 (06 Sep 2018 20:10)  HR: 52 (07 Sep 2018 08:23) (52 - 66)  BP: 156/69 (07 Sep 2018 08:23) (106/53 - 156/69)  BP(mean): 89 (06 Sep 2018 20:17) (76 - 89)  RR: 17 (07 Sep 2018 08:23) (16 - 17)  SpO2: 99% (07 Sep 2018 08:23) (98% - 99%)    REVIEW OF SYSTEMS:    CONSTITUTIONAL: No fever, weight loss, or fatigue  EYES: No eye pain, visual disturbances, or discharge  ENMT:  No difficulty hearing, tinnitus, vertigo; No sinus or throat pain  NECK: No pain or stiffness  BREASTS: No pain, masses, or nipple discharge  RESPIRATORY: No cough, wheezing, chills or hemoptysis; No shortness of breath  CARDIOVASCULAR: No chest pain, palpitations, dizziness, or leg swelling  GASTROINTESTINAL: No abdominal or epigastric pain. No nausea, vomiting, or hematemesis; No diarrhea or constipation. No melena or hematochezia.  GENITOURINARY: No dysuria, frequency, hematuria, or incontinence  NEUROLOGICAL: intermittent dizziness, improved since admission  SKIN: No itching, burning, rashes, or lesions   LYMPH NODES: No enlarged glands  ENDOCRINE: No heat or cold intolerance; No hair loss  MUSCULOSKELETAL: No joint pain or swelling; No muscle, back, or extremity pain  PSYCHIATRIC: No depression, anxiety, mood swings, or difficulty sleeping  HEME/LYMPH: No easy bruising, or bleeding gums  ALLERGY AND IMMUNOLOGIC: No hives or eczema  VASCULAR: no swelling, erythema      Physical Exam: WDWN  woman lying in semi Shah's position, no current c/o dizziness    Head: normocephalic, atraumatic    Eyes: PERRLA, EOMI, no nystagmus, sclera anicteric    ENT: nasal discharge, uvula midline, no oropharyngeal erythema/exudate    Neck: supple, negative JVD, negative carotid bruits, no thyromegaly    Chest: CTA bilaterally, neg wheeze, rhonchi, rales, crackles, egophany    Cardiovascular: regular rate and rhythm, neg murmurs/rubs/gallops    Abdomen: soft, non distended, non tender, negative rebound/guarding, normal bowel sounds, neg hepatosplenomegaly    Extremities: WWP, neg cyanosis/clubbing/edema, negative calf tenderness to palpation, negative Savita's sign, left thigh ecchymosis/hematoma    :     Neurologic Exam:    Alert and oriented to person, place, date/year, speech fluent w/o dysarthria, repetition intact, comprehension intact,     Cranial Nerves:     II:                       pupils equal, round and reactive to light, visual fields intact   III/ IV/VI:            extraocular movements intact, neg nystagmus, ptosis  V:                       facial sensation intact, V1-3 normal  VII:                     face symmetric, no droop, normal eye closure and smile  VIII:                    hearing intact to finger rub bilaterally  IX/ X:                 soft palate rise symmetrical  XI:                      head turning, shoulder shrug normal  XII:                     tongue midline    Motor Exam:    Upper Extremities:              Right:    5/5 /intrinsics                                                          5/5 deltoid                                                          5/5 biceps                                                          5/5 triceps                                                          5/5 wrist extensors-flexors                                                          negative pronator drift                                                Left:      5/5 /intrinsics                                                          5/5 deltoid                                                          5/5 biceps                                                          5/5 triceps                                                          5/5 wrist extensors/flexors                                                          negative pronator drift      Lower Extremities:             Right:      5/5 hip flexors/adductors/abductors                                                           5/5 quadriceps/hamstrings                                                           5/5 dorsiflexors/plantar flexors/invertors-evertors                                               Left:       5/5 hip flexors/adductors/abductors                                                            5/5 quadriceps/hamstrings                                                            5/5 dorsiflexors/plantar flexors/invertors-evertors    Sensory:              intact to LT/PP in all UE/LE dermatomes    DTR:                   = biceps/     triceps/     brachioradialis                            = patella/   medial hamstring/    ankle                            neg clonus                            neg Babinski                            neg Hoffmans    Finger to Nose:  wnl    Heel to Shin:       wnl    Rapid Alternating movements:   wnl    Joint Position Sense:  intact    Romberg:  not tested    Tandem Walking:  not tested    Gait:  not tested        PM&R Impression:    1) dizziness  2) no focal neuro deficits      Recommendations:    1) Physical therapy focusing on therapeutic exercises, bed mobility/transfer out of bed evaluation, progressive ambulation with assistive devices prn    2) Anticipated Disposition Plan: d/c home with no post discharge rehab needs

## 2018-09-07 NOTE — DIETITIAN INITIAL EVALUATION ADULT. - OTHER INFO
77 yo/female with PMHx breast cancer s/p lumpectomy and radiation, presented with dizziness, headache and emesis. Admitted for vestibular lesion work up. Pt seen in room, sleeping, but easily awakened to name. She endorses good appetite at home PTA, no dietary restrictions followed. Currently fair intake 2/2 lack of appetite and some nausea. ~40-50% intake at breakfast reported. NKFA. No difficulty chewing or swallowing reported. Skin intact pressure-wise. No pain reported. Discussed liberalized, regular diet and encouraged PO intake.

## 2018-09-10 ENCOUNTER — INBOUND DOCUMENT (OUTPATIENT)
Age: 78
End: 2018-09-10

## 2018-09-10 PROBLEM — C50.919 MALIGNANT NEOPLASM OF UNSPECIFIED SITE OF UNSPECIFIED FEMALE BREAST: Chronic | Status: ACTIVE | Noted: 2018-09-06

## 2018-09-12 DIAGNOSIS — C50.919 MALIGNANT NEOPLASM OF UNSPECIFIED SITE OF UNSPECIFIED FEMALE BREAST: ICD-10-CM

## 2018-09-12 DIAGNOSIS — H81.10 BENIGN PAROXYSMAL VERTIGO, UNSPECIFIED EAR: ICD-10-CM

## 2018-09-12 DIAGNOSIS — R42 DIZZINESS AND GIDDINESS: ICD-10-CM

## 2018-09-12 DIAGNOSIS — Z96.622 PRESENCE OF LEFT ARTIFICIAL ELBOW JOINT: ICD-10-CM

## 2018-09-13 ENCOUNTER — FORM ENCOUNTER (OUTPATIENT)
Age: 78
End: 2018-09-13

## 2018-09-14 ENCOUNTER — OUTPATIENT (OUTPATIENT)
Dept: OUTPATIENT SERVICES | Facility: HOSPITAL | Age: 78
LOS: 1 days | End: 2018-09-14
Payer: MEDICARE

## 2018-09-14 ENCOUNTER — APPOINTMENT (OUTPATIENT)
Dept: MRI IMAGING | Facility: CLINIC | Age: 78
End: 2018-09-14

## 2018-09-14 DIAGNOSIS — Z96.622 PRESENCE OF LEFT ARTIFICIAL ELBOW JOINT: Chronic | ICD-10-CM

## 2018-09-14 PROCEDURE — 70553 MRI BRAIN STEM W/O & W/DYE: CPT

## 2018-09-14 PROCEDURE — A9585: CPT

## 2018-09-16 ENCOUNTER — TRANSCRIPTION ENCOUNTER (OUTPATIENT)
Age: 78
End: 2018-09-16

## 2019-01-24 ENCOUNTER — APPOINTMENT (OUTPATIENT)
Dept: INTERNAL MEDICINE | Facility: CLINIC | Age: 79
End: 2019-01-24
Payer: MEDICARE

## 2019-01-24 VITALS
SYSTOLIC BLOOD PRESSURE: 123 MMHG | OXYGEN SATURATION: 100 % | WEIGHT: 148 LBS | HEIGHT: 66 IN | TEMPERATURE: 97.7 F | DIASTOLIC BLOOD PRESSURE: 72 MMHG | HEART RATE: 6 BPM | BODY MASS INDEX: 23.78 KG/M2

## 2019-01-24 DIAGNOSIS — Z85.3 PERSONAL HISTORY OF MALIGNANT NEOPLASM OF BREAST: ICD-10-CM

## 2019-01-24 DIAGNOSIS — R42 DIZZINESS AND GIDDINESS: ICD-10-CM

## 2019-01-24 DIAGNOSIS — Z87.891 PERSONAL HISTORY OF NICOTINE DEPENDENCE: ICD-10-CM

## 2019-01-24 DIAGNOSIS — Z82.49 FAMILY HISTORY OF ISCHEMIC HEART DISEASE AND OTHER DISEASES OF THE CIRCULATORY SYSTEM: ICD-10-CM

## 2019-01-24 DIAGNOSIS — Z78.9 OTHER SPECIFIED HEALTH STATUS: ICD-10-CM

## 2019-01-24 DIAGNOSIS — Z86.69 PERSONAL HISTORY OF OTHER DISEASES OF THE NERVOUS SYSTEM AND SENSE ORGANS: ICD-10-CM

## 2019-01-24 PROCEDURE — 99214 OFFICE O/P EST MOD 30 MIN: CPT | Mod: 25

## 2019-01-24 PROCEDURE — 93000 ELECTROCARDIOGRAM COMPLETE: CPT

## 2019-01-24 PROCEDURE — 36415 COLL VENOUS BLD VENIPUNCTURE: CPT

## 2019-01-24 PROCEDURE — G0439: CPT

## 2019-01-24 NOTE — HEALTH RISK ASSESSMENT
[Very Good] : ~his/her~  mood as very good [No falls in past year] : Patient reported no falls in the past year [0] : 2) Feeling down, depressed, or hopeless: Not at all (0) [HIV Test offered] : HIV Test offered [Hepatitis C test offered] : Hepatitis C test offered [Patient reported mammogram was normal] : Patient reported mammogram was normal [Patient reported PAP Smear was normal] : Patient reported PAP Smear was normal [Patient reported colonoscopy was normal] : Patient reported colonoscopy was normal [] : No [GFN2Moemi] : 0 [MammogramDate] : 9/2018 [PapSmearDate] : 9/2018 [ColonoscopyDate] : 2017

## 2019-01-24 NOTE — PHYSICAL EXAM

## 2019-01-24 NOTE — ASSESSMENT
[FreeTextEntry1] : Patient provided with education on recommended exercise, proper diet, recommended age appropriate screening tests, vaccines, sexual health, smoking cessation and moderate alcohol intake.  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 followed by pulm at [place] until he retired last yr

## 2019-01-24 NOTE — HISTORY OF PRESENT ILLNESS
[de-identified] : New patient, 78 yr old woman referred by her gyn Dr Burgess, here for physical and to establish care. She has a history of left breast cancer, up to date with preventive care.  In mid Sept when she was going to climb out of her bedroom loft to the rest of the apartment, she felt sudden onset severe pain in her head; she then vomited every time she moved.  She was taken to [place] by her daughter.  Had an MRI, which is in our records, reviewed by Dr Britt. She hasn't yet seen neurologist; no further episodes, has not had an echo or carotid\par \par Her doc Dr Andrew Terrell just retired recently

## 2019-01-25 LAB
25(OH)D3 SERPL-MCNC: 28.1 NG/ML
ALBUMIN SERPL ELPH-MCNC: 4.5 G/DL
ALP BLD-CCNC: 61 U/L
ALT SERPL-CCNC: 16 U/L
ANION GAP SERPL CALC-SCNC: 11 MMOL/L
APPEARANCE: CLEAR
AST SERPL-CCNC: 22 U/L
BACTERIA: NEGATIVE
BASOPHILS # BLD AUTO: 0.01 K/UL
BASOPHILS NFR BLD AUTO: 0.2 %
BILIRUB SERPL-MCNC: 0.3 MG/DL
BILIRUBIN URINE: NEGATIVE
BLOOD URINE: NEGATIVE
BUN SERPL-MCNC: 9 MG/DL
CALCIUM SERPL-MCNC: 9.5 MG/DL
CHLORIDE SERPL-SCNC: 104 MMOL/L
CHOLEST SERPL-MCNC: 239 MG/DL
CHOLEST/HDLC SERPL: 2.8 RATIO
CO2 SERPL-SCNC: 24 MMOL/L
COLOR: YELLOW
CREAT SERPL-MCNC: 0.66 MG/DL
EOSINOPHIL # BLD AUTO: 0.03 K/UL
EOSINOPHIL NFR BLD AUTO: 0.7 %
FOLATE SERPL-MCNC: 17.3 NG/ML
GLUCOSE QUALITATIVE U: NEGATIVE MG/DL
GLUCOSE SERPL-MCNC: 96 MG/DL
HCT VFR BLD CALC: 42.2 %
HCV AB SER QL: NONREACTIVE
HCV S/CO RATIO: 0.13 S/CO
HDLC SERPL-MCNC: 86 MG/DL
HGB BLD-MCNC: 13.9 G/DL
HYALINE CASTS: 0 /LPF
IMM GRANULOCYTES NFR BLD AUTO: 0 %
KETONES URINE: NEGATIVE
LDLC SERPL CALC-MCNC: 129 MG/DL
LEUKOCYTE ESTERASE URINE: NEGATIVE
LYMPHOCYTES # BLD AUTO: 1.29 K/UL
LYMPHOCYTES NFR BLD AUTO: 29.9 %
MAN DIFF?: NORMAL
MCHC RBC-ENTMCNC: 30.5 PG
MCHC RBC-ENTMCNC: 32.9 GM/DL
MCV RBC AUTO: 92.7 FL
MICROSCOPIC-UA: NORMAL
MONOCYTES # BLD AUTO: 0.3 K/UL
MONOCYTES NFR BLD AUTO: 7 %
NEUTROPHILS # BLD AUTO: 2.68 K/UL
NEUTROPHILS NFR BLD AUTO: 62.2 %
NITRITE URINE: NEGATIVE
PH URINE: 7
PLATELET # BLD AUTO: 186 K/UL
POTASSIUM SERPL-SCNC: 4.3 MMOL/L
PROT SERPL-MCNC: 6.8 G/DL
PROTEIN URINE: NEGATIVE MG/DL
RBC # BLD: 4.55 M/UL
RBC # FLD: 13.5 %
RED BLOOD CELLS URINE: 3 /HPF
SODIUM SERPL-SCNC: 139 MMOL/L
SPECIFIC GRAVITY URINE: 1.01
SQUAMOUS EPITHELIAL CELLS: 0 /HPF
T PALLIDUM AB SER QL IA: NEGATIVE
TRIGL SERPL-MCNC: 118 MG/DL
TSH SERPL-ACNC: 2.42 UIU/ML
UROBILINOGEN URINE: NEGATIVE MG/DL
VIT B12 SERPL-MCNC: 299 PG/ML
WBC # FLD AUTO: 4.31 K/UL
WHITE BLOOD CELLS URINE: 0 /HPF

## 2019-01-27 LAB — HIV1+2 AB SPEC QL IA.RAPID: NONREACTIVE

## 2019-01-29 ENCOUNTER — FORM ENCOUNTER (OUTPATIENT)
Age: 79
End: 2019-01-29

## 2019-01-30 ENCOUNTER — OUTPATIENT (OUTPATIENT)
Dept: OUTPATIENT SERVICES | Facility: HOSPITAL | Age: 79
LOS: 1 days | End: 2019-01-30
Payer: MEDICARE

## 2019-01-30 ENCOUNTER — APPOINTMENT (OUTPATIENT)
Dept: ULTRASOUND IMAGING | Facility: HOSPITAL | Age: 79
End: 2019-01-30
Payer: MEDICARE

## 2019-01-30 DIAGNOSIS — Z96.622 PRESENCE OF LEFT ARTIFICIAL ELBOW JOINT: Chronic | ICD-10-CM

## 2019-01-30 PROCEDURE — 93880 EXTRACRANIAL BILAT STUDY: CPT | Mod: 26

## 2019-01-30 PROCEDURE — 71046 X-RAY EXAM CHEST 2 VIEWS: CPT

## 2019-01-30 PROCEDURE — 71046 X-RAY EXAM CHEST 2 VIEWS: CPT | Mod: 26

## 2019-01-30 PROCEDURE — 93306 TTE W/DOPPLER COMPLETE: CPT | Mod: 26

## 2019-01-30 PROCEDURE — 93880 EXTRACRANIAL BILAT STUDY: CPT

## 2019-01-30 PROCEDURE — 93306 TTE W/DOPPLER COMPLETE: CPT

## 2019-02-19 ENCOUNTER — APPOINTMENT (OUTPATIENT)
Dept: INTERNAL MEDICINE | Facility: CLINIC | Age: 79
End: 2019-02-19
Payer: MEDICARE

## 2019-02-19 VITALS
HEART RATE: 75 BPM | BODY MASS INDEX: 24.11 KG/M2 | DIASTOLIC BLOOD PRESSURE: 68 MMHG | OXYGEN SATURATION: 100 % | SYSTOLIC BLOOD PRESSURE: 115 MMHG | HEIGHT: 66 IN | WEIGHT: 150 LBS | TEMPERATURE: 97.1 F

## 2019-02-19 PROCEDURE — 99214 OFFICE O/P EST MOD 30 MIN: CPT

## 2019-03-07 NOTE — ADDENDUM
[FreeTextEntry1] : LEFT VM FOR HER that we will mail CT chest requisition; to get CT (doesn't qualify for screening program, but needs CT for 6 mo chronic cough) f/u after study

## 2019-03-07 NOTE — HISTORY OF PRESENT ILLNESS
[de-identified] : 78 yr old woman w history of breast ca s/p fall prior to initial visit, here in followup. Had CXR, echo and carotids since last visit. Studies reviewed. No further vertigo. Had been in lung ca screening program informally w retired physician\par \par 1/24/19:New patient, 78 yr old woman referred by her gyn Dr Burgess, here for physical and to establish care. She has a history of left breast cancer, up to date with preventive care.  In mid Sept when she was going to climb out of her bedroom loft to the rest of the apartment, she felt sudden onset severe pain in her head; she then vomited every time she moved.  She was taken to [place] by her daughter.  Had an MRI, which is in our records, reviewed by Dr Britt. She hasn't yet seen neurologist; no further episodes, has not had an echo or carotid\par \par Her doc Dr Andrew Terrell just retired recently

## 2019-03-07 NOTE — ASSESSMENT
[FreeTextEntry1] : Joyce 78 yr old on ASA and will restart statin\par no diagnosis found after fall\par will return for prevnar when available\par f/u Dr Burgess re history of breast cancer, up to date\par \par f/u annually at [facility]\par she was followed previously by a pulmonologist (who retired ) and he was getting chest xrays\par

## 2019-03-25 ENCOUNTER — FORM ENCOUNTER (OUTPATIENT)
Age: 79
End: 2019-03-25

## 2019-03-26 ENCOUNTER — APPOINTMENT (OUTPATIENT)
Dept: CT IMAGING | Facility: CLINIC | Age: 79
End: 2019-03-26
Payer: MEDICARE

## 2019-03-26 ENCOUNTER — OUTPATIENT (OUTPATIENT)
Dept: OUTPATIENT SERVICES | Facility: HOSPITAL | Age: 79
LOS: 1 days | End: 2019-03-26

## 2019-03-26 DIAGNOSIS — Z96.622 PRESENCE OF LEFT ARTIFICIAL ELBOW JOINT: Chronic | ICD-10-CM

## 2019-03-26 PROCEDURE — 71250 CT THORAX DX C-: CPT | Mod: 26

## 2019-04-17 ENCOUNTER — APPOINTMENT (OUTPATIENT)
Dept: PULMONOLOGY | Facility: CLINIC | Age: 79
End: 2019-04-17
Payer: MEDICARE

## 2019-04-17 VITALS
HEART RATE: 80 BPM | TEMPERATURE: 99.1 F | DIASTOLIC BLOOD PRESSURE: 68 MMHG | WEIGHT: 150 LBS | SYSTOLIC BLOOD PRESSURE: 120 MMHG | OXYGEN SATURATION: 98 % | HEIGHT: 66 IN | BODY MASS INDEX: 24.11 KG/M2

## 2019-04-17 PROCEDURE — 94010 BREATHING CAPACITY TEST: CPT

## 2019-04-17 PROCEDURE — 99204 OFFICE O/P NEW MOD 45 MIN: CPT | Mod: 25

## 2019-04-17 NOTE — ASSESSMENT
[FreeTextEntry1] : Data reviewed:\par \par CT chest [facility] 3/27/19 personally reviewed: Clustered centrilobular tree-in-bud micronodules in the right upper lobe, posterior segment. Bibasilar atelectasis. No emphysematous changes. No evidence of interstitial lung disease. Small partially calcified mediastinal and bilateral hilar nodes. The latter could be due to chronic granulomatous disease including sarcoid. \par \par [place] 04/17/2019 : normal\par \par Impression:\par Chronic cough with minimal airways disease on CT\par \par Plan:\par The CT changes are quite minor. The patient is seeking reassurance more than medication. Given that there is no apparent significant underlying disease, she will tolerate the cough.\par Can see me as desired for new or worsening symptoms.

## 2019-04-17 NOTE — PHYSICAL EXAM
[General Appearance - Well Developed] : well developed [General Appearance - In No Acute Distress] : no acute distress [General Appearance - Well Nourished] : well nourished [Normal Conjunctiva] : the conjunctiva exhibited no abnormalities [Normal Oropharynx] : normal oropharynx [FreeTextEntry1] : no LAD [Heart Sounds] : normal S1 and S2 [Heart Rate And Rhythm] : heart rate and rhythm were normal [Murmurs] : no murmurs present [Edema] : no peripheral edema present [Bowel Sounds] : normal bowel sounds [Auscultation Breath Sounds / Voice Sounds] : lungs were clear to auscultation bilaterally [Abdomen Tenderness] : non-tender [Abdomen Soft] : soft [Nail Clubbing] : no clubbing of the fingernails [Cyanosis, Localized] : no localized cyanosis [] : no rash [Affect] : the affect was normal

## 2019-04-17 NOTE — HISTORY OF PRESENT ILLNESS
[FreeTextEntry1] : 04/17/2019: Asked to evaluate patient by Dr Wilson for chronic cough. Coughs x years. Worst is 30 sec of coughing. May come after eating, or running in country. May produce a small amount of sputum. Reports having a barium swallow that was negative, CXRs. Cough present pretty much daily. No clear difference time of day or season. No real PND symptoms. No GERD sx. No dyspnea, runs regularly. No asthma. Smoked age 16 till 2004 breast cancer dx, < 1ppd. L breast cancer, surg, RT at [facility].\par

## 2019-04-17 NOTE — CONSULT LETTER
[Dear  ___] : Dear  [unfilled], [( Thank you for referring [unfilled] for consultation for _____ )] : Thank you for referring [unfilled] for consultation for [unfilled] [Please see my note below.] : Please see my note below. [Consult Closing:] : Thank you very much for allowing me to participate in the care of this patient.  If you have any questions, please do not hesitate to contact me. [FreeTextEntry2] : Delmi Wilson MD\par [address]\[place] [address] [Sincerely,] : Sincerely, [FreeTextEntry3] : Sabine Walsh MD, FCCP\par

## 2019-04-24 NOTE — DIETITIAN INITIAL EVALUATION ADULT. - PROBLEM/PLAN-1
[facility] Cardiology Progress Note       NAME:  Vielka Conway :   1976 MRN:   [id number] Assessment/Plan: doing well this am no recurrent cp has had a good night of sleep NSTEMI: s/p PCI of RCA with noted thrombus and residual 2 om stenosis of 70% and 80% Continue asa and brylinta for now Continue high potency statin (lipitor 40) Change coreg to toprol and continue bblocker for now given stemi 
 continue iv heparin for another 24 hours give thrombus presence at the time of cath Ambulate 
 cardiac wellness to see Check echo today for EF Subjective:  
Cardiac ROS: Patient denies any exertional chest pain, dyspnea, palpitations, syncope, orthopnea, edema or paroxysmal nocturnal dyspnea. Review of Systems:  
Pertinent items are noted in the History of Present Illness. Objective:  
 
 
701 - 1900 In: 1356.8 [P.O.:236; I.V.:1120.8] Out: -  
 
1901 -  0700 In: 2048.9 [P.O.:1315; I.V.:733.9] Out: 1850 [QMGB] Telemetry: normal sinus rhythm Physical Exam: 
 
Visit Vitals /69 (BP 1 Location: Left arm, BP Patient Position: At rest) Pulse (!) 59 Temp 98.6 °F (37 °C) Resp 18 Ht 6' (1.829 m) Wt 84.6 kg (186 lb 8.2 oz) SpO2 98% BMI 25.30 kg/m² General Appearance:  Well developed, well nourished,alert and oriented x 3, and individual in no acute distress. Ears/Nose/Mouth/Throat:   Hearing grossly normal. 
  
    Neck: Supple. Chest:   Lungs clear to auscultation bilaterally. Cardiovascular:  Regular rate and rhythm, S1, S2 normal, no murmur. Abdomen:   Soft, non-tender, bowel sounds are active. Extremities: No edema bilaterally. Skin: Warm and dry. Additional comments: None Care Plan discussed with: 
  Comments Patient Family RN Care Manager Consultant:     
 
Data Review: No results for input(s): TNIPOC in the last 72 hours. No lab exists for component: ITNL Recent Labs  
  04/23/19 
0414 04/22/19 
2224 04/22/19 
1801 TROIQ 1.51* 1.47* 1.20* Recent Labs  
  04/24/19 
0325 04/23/19 
1655 04/23/19 
0414 04/22/19 
1319 NA  --   --  140 139 K  --   --  3.9 3.7 CL  --   --  108 106 CO2  --   --  27 26 BUN  --   --  13 13 CREA  --   --  0.91 0.94 GLU  --   --  98 106* MG  --   --  2.3  --   
ALB  --   --   --  3.8 WBC 8.2 6.9  --  6.2 HGB 13.2 13.9  --  14.6 HCT 40.4 41.6  --  43.3  175  --  172 Recent Labs  
  04/24/19 0325 04/23/19 1655 APTT 29.3 37.2* Medications reviewed Current Facility-Administered Medications Medication Dose Route Frequency  metoprolol succinate (TOPROL-XL) XL tablet 25 mg  25 mg Oral DAILY  atorvastatin (LIPITOR) tablet 40 mg  40 mg Oral QHS  ticagrelor (BRILINTA) tablet 90 mg  90 mg Oral Q12H  
 sodium chloride (NS) flush 5-40 mL  5-40 mL IntraVENous Q8H  
 sodium chloride (NS) flush 5-40 mL  5-40 mL IntraVENous PRN  
 heparin 25,000 units in D5W 250 ml infusion  11-25 Units/kg/hr IntraVENous TITRATE  aspirin chewable tablet 81 mg  81 mg Oral DAILY  amLODIPine (NORVASC) tablet 5 mg  5 mg Oral DAILY And  
 lisinopril (PRINIVIL, ZESTRIL) tablet 10 mg  10 mg Oral DAILY Data Reviewed: current meds, labs,recent radiology, intake/output/weight and problem list reviewed Ness Hanna MD] DISPLAY PLAN FREE TEXT

## 2019-10-01 ENCOUNTER — RX RENEWAL (OUTPATIENT)
Age: 79
End: 2019-10-01

## 2019-10-29 ENCOUNTER — APPOINTMENT (OUTPATIENT)
Dept: INTERNAL MEDICINE | Facility: CLINIC | Age: 79
End: 2019-10-29
Payer: MEDICARE

## 2019-10-29 VITALS
HEART RATE: 76 BPM | DIASTOLIC BLOOD PRESSURE: 62 MMHG | WEIGHT: 142 LBS | HEIGHT: 66 IN | BODY MASS INDEX: 22.82 KG/M2 | OXYGEN SATURATION: 98 % | TEMPERATURE: 97.8 F | SYSTOLIC BLOOD PRESSURE: 105 MMHG

## 2019-10-29 PROCEDURE — 90670 PCV13 VACCINE IM: CPT

## 2019-10-29 PROCEDURE — G0009: CPT

## 2019-10-29 PROCEDURE — 99214 OFFICE O/P EST MOD 30 MIN: CPT | Mod: 25

## 2019-10-29 NOTE — HISTORY OF PRESENT ILLNESS
[de-identified] : 79 yr old woman with HLD and history of breast cancer here in follow up, last here in February. Since then she saw Dr Walsh, who reviewed her CT scan chest from 3/19 and reassured her no significant findings; and had normal spirometry. She is a prior smoker. ? Needs Prevnar. had flu in pharm. She recently saw Dr Burgess and c/o cough and she ordered another CT chest. hasn’t tried to rx empirically for possibly GERD related cough. Swims several times a week, runs, unlimited by CP sx.\par \par LAST VISIT (2/19/19):  78 yr old woman w history of breast ca s/p fall prior to initial visit, here in followup. Had CXR, echo and carotids since last visit. Studies reviewed. No further vertigo. Had been in lung ca screening program informally w retired physician\par \par 1/24/19:New patient, 78 yr old woman referred by her gyn Dr Burgess, here for physical and to establish care. She has a history of left breast cancer, up to date with preventive care.  In mid Sept when she was going to climb out of her bedroom loft to the rest of the apartment, she felt sudden onset severe pain in her head; she then vomited every time she moved.  She was taken to [place] by her daughter.  Had an MRI, which is in our records, reviewed by Dr Britt. She hasn't yet seen neurologist; no further episodes, has not had an echo or carotid\par \par Her doc Dr Andrew Terrell just retired recently

## 2020-01-28 ENCOUNTER — NON-APPOINTMENT (OUTPATIENT)
Age: 80
End: 2020-01-28

## 2020-01-28 ENCOUNTER — APPOINTMENT (OUTPATIENT)
Dept: INTERNAL MEDICINE | Facility: CLINIC | Age: 80
End: 2020-01-28
Payer: MEDICARE

## 2020-01-28 VITALS
DIASTOLIC BLOOD PRESSURE: 72 MMHG | TEMPERATURE: 98.3 F | BODY MASS INDEX: 22.66 KG/M2 | HEIGHT: 66 IN | SYSTOLIC BLOOD PRESSURE: 121 MMHG | WEIGHT: 141 LBS | OXYGEN SATURATION: 98 % | HEART RATE: 63 BPM

## 2020-01-28 DIAGNOSIS — E55.9 VITAMIN D DEFICIENCY, UNSPECIFIED: ICD-10-CM

## 2020-01-28 DIAGNOSIS — H81.10 BENIGN PAROXYSMAL VERTIGO, UNSPECIFIED EAR: ICD-10-CM

## 2020-01-28 PROCEDURE — 93000 ELECTROCARDIOGRAM COMPLETE: CPT

## 2020-01-28 PROCEDURE — 36415 COLL VENOUS BLD VENIPUNCTURE: CPT

## 2020-01-28 PROCEDURE — G0439: CPT

## 2020-01-28 NOTE — PHYSICAL EXAM
[No Acute Distress] : no acute distress [Well Developed] : well developed [Well Nourished] : well nourished [PERRL] : pupils equal round and reactive to light [Normal Sclera/Conjunctiva] : normal sclera/conjunctiva [Well-Appearing] : well-appearing [EOMI] : extraocular movements intact [No JVD] : no jugular venous distention [No Lymphadenopathy] : no lymphadenopathy [Normal Oropharynx] : the oropharynx was normal [Normal Outer Ear/Nose] : the outer ears and nose were normal in appearance [Thyroid Normal, No Nodules] : the thyroid was normal and there were no nodules present [Supple] : supple [No Accessory Muscle Use] : no accessory muscle use [No Respiratory Distress] : no respiratory distress  [Clear to Auscultation] : lungs were clear to auscultation bilaterally [Regular Rhythm] : with a regular rhythm [Normal Rate] : normal rate  [No Murmur] : no murmur heard [Normal S1, S2] : normal S1 and S2 [No Abdominal Bruit] : a ~M bruit was not heard ~T in the abdomen [No Carotid Bruits] : no carotid bruits [No Varicosities] : no varicosities [No Edema] : there was no peripheral edema [Pedal Pulses Present] : the pedal pulses are present [No Palpable Aorta] : no palpable aorta [No Extremity Clubbing/Cyanosis] : no extremity clubbing/cyanosis [Soft] : abdomen soft [Non-distended] : non-distended [Non Tender] : non-tender [No Masses] : no abdominal mass palpated [No HSM] : no HSM [Normal Bowel Sounds] : normal bowel sounds [Normal Posterior Cervical Nodes] : no posterior cervical lymphadenopathy [Normal Anterior Cervical Nodes] : no anterior cervical lymphadenopathy [No CVA Tenderness] : no CVA  tenderness [No Joint Swelling] : no joint swelling [No Spinal Tenderness] : no spinal tenderness [No Rash] : no rash [Coordination Grossly Intact] : coordination grossly intact [Grossly Normal Strength/Tone] : grossly normal strength/tone [No Focal Deficits] : no focal deficits [Deep Tendon Reflexes (DTR)] : deep tendon reflexes were 2+ and symmetric [Normal Gait] : normal gait [Normal Affect] : the affect was normal [Normal Insight/Judgement] : insight and judgment were intact

## 2020-01-29 LAB
25(OH)D3 SERPL-MCNC: 51.8 NG/ML
ALBUMIN SERPL ELPH-MCNC: 4.6 G/DL
ALP BLD-CCNC: 69 U/L
ALT SERPL-CCNC: 19 U/L
ANION GAP SERPL CALC-SCNC: 16 MMOL/L
APPEARANCE: CLEAR
AST SERPL-CCNC: 22 U/L
BACTERIA: NEGATIVE
BASOPHILS # BLD AUTO: 0.01 K/UL
BASOPHILS NFR BLD AUTO: 0.2 %
BILIRUB SERPL-MCNC: 0.4 MG/DL
BILIRUBIN URINE: NEGATIVE
BLOOD URINE: NEGATIVE
BUN SERPL-MCNC: 11 MG/DL
CALCIUM SERPL-MCNC: 9.5 MG/DL
CHLORIDE SERPL-SCNC: 106 MMOL/L
CHOLEST SERPL-MCNC: 157 MG/DL
CHOLEST/HDLC SERPL: 1.9 RATIO
CO2 SERPL-SCNC: 21 MMOL/L
COLOR: NORMAL
CREAT SERPL-MCNC: 0.66 MG/DL
EOSINOPHIL # BLD AUTO: 0.02 K/UL
EOSINOPHIL NFR BLD AUTO: 0.4 %
FOLATE SERPL-MCNC: 14.3 NG/ML
GLUCOSE QUALITATIVE U: NEGATIVE
GLUCOSE SERPL-MCNC: 92 MG/DL
HCT VFR BLD CALC: 43.5 %
HDLC SERPL-MCNC: 82 MG/DL
HGB BLD-MCNC: 13.9 G/DL
HYALINE CASTS: 0 /LPF
IMM GRANULOCYTES NFR BLD AUTO: 0.2 %
KETONES URINE: NEGATIVE
LDLC SERPL CALC-MCNC: 52 MG/DL
LEUKOCYTE ESTERASE URINE: NEGATIVE
LYMPHOCYTES # BLD AUTO: 1.41 K/UL
LYMPHOCYTES NFR BLD AUTO: 26.4 %
MAN DIFF?: NORMAL
MCHC RBC-ENTMCNC: 31.2 PG
MCHC RBC-ENTMCNC: 32 GM/DL
MCV RBC AUTO: 97.8 FL
MICROSCOPIC-UA: NORMAL
MONOCYTES # BLD AUTO: 0.42 K/UL
MONOCYTES NFR BLD AUTO: 7.9 %
NEUTROPHILS # BLD AUTO: 3.48 K/UL
NEUTROPHILS NFR BLD AUTO: 64.9 %
NITRITE URINE: NEGATIVE
PH URINE: 7
PLATELET # BLD AUTO: 186 K/UL
POTASSIUM SERPL-SCNC: 4.6 MMOL/L
PROT SERPL-MCNC: 6.5 G/DL
PROTEIN URINE: NEGATIVE
RBC # BLD: 4.45 M/UL
RBC # FLD: 13.2 %
RED BLOOD CELLS URINE: 3 /HPF
SODIUM SERPL-SCNC: 142 MMOL/L
SPECIFIC GRAVITY URINE: 1.02
SQUAMOUS EPITHELIAL CELLS: 2 /HPF
TRIGL SERPL-MCNC: 116 MG/DL
TSH SERPL-ACNC: 2.02 UIU/ML
UROBILINOGEN URINE: NORMAL
VIT B12 SERPL-MCNC: 327 PG/ML
WBC # FLD AUTO: 5.35 K/UL
WHITE BLOOD CELLS URINE: 1 /HPF

## 2020-01-31 NOTE — HISTORY OF PRESENT ILLNESS
[de-identified] : 79 yr old with HLD, history of breast ca, here for physical. Runs 40 min every morning when [place]'s [place]state in [place], [place] w [place]; and swims when in [place].  Was working in [place] until 6 mo ago and looking for another job. Interim exec director of nonprofits \par \par 10/29/19   79 yr old woman with HLD and history of breast cancer here in follow up, last here in February. Since then she saw Dr Walsh, who reviewed her CT scan chest from 3/19 and reassured her no significant findings; and had normal spirometry. She is a prior smoker. ? Needs Prevnar. had flu in pharm. She recently saw Dr Burgess and c/o cough and she ordered another CT chest. hasn’t tried to rx empirically for possibly GERD related cough. Swims several times a week, runs, unlimited by CP sx.\par \par LAST VISIT (2/19/19):  78 yr old woman w history of breast ca s/p fall prior to initial visit, here in followup. Had CXR, echo and carotids since last visit. Studies reviewed. No further vertigo. Had been in lung ca screening program informally w retired physician\par \par 1/24/19:New patient, 78 yr old woman referred by her gyn Dr Burgess, here for physical and to establish care. She has a history of left breast cancer, up to date with preventive care.  In mid Sept when she was going to climb out of her bedroom loft to the rest of the apartment, she felt sudden onset severe pain in her head; she then vomited every time she moved.  She was taken to [place] by her daughter.  Had an MRI, which is in our records, reviewed by Dr Britt. She hasn't yet seen neurologist; no further episodes, has not had an echo or carotid\par \par Her doc Dr Andrew Terrell just retired recently

## 2020-01-31 NOTE — ASSESSMENT
[FreeTextEntry1] : Patient provided with education on recommended exercise, proper diet, recommended age appropriate screening tests, vaccines, sexual health, smoking cessation and moderate alcohol intake.  STI screening offered including HIV/ Hep C check; proper use of seat belts, helmets on bicycles, also recommended.\par \par f/u 6 mo/ prn\par \par

## 2020-10-05 NOTE — ED PROVIDER NOTE - CHIEF COMPLAINT
Pt lives with girlfriend. Works in a restaurant. Started smoking at an early age and used to be a heavy smoker, >15yr smoking history, however has cut down in these last few years to 1-2 cigarettes or fewer daily. Social drinker (last drink: last night, 4 drinks). Denies illicit drug use.
The patient is a 77y Female complaining of shoulder pain/injury.

## 2020-11-12 ENCOUNTER — TRANSCRIPTION ENCOUNTER (OUTPATIENT)
Age: 80
End: 2020-11-12

## 2020-11-15 ENCOUNTER — EMERGENCY (EMERGENCY)
Facility: HOSPITAL | Age: 80
LOS: 1 days | Discharge: HOME | End: 2020-11-15
Admitting: EMERGENCY MEDICINE
Payer: MEDICARE

## 2020-11-15 VITALS
TEMPERATURE: 99 F | WEIGHT: 139.99 LBS | HEART RATE: 63 BPM | SYSTOLIC BLOOD PRESSURE: 138 MMHG | RESPIRATION RATE: 16 BRPM | HEIGHT: 66 IN | DIASTOLIC BLOOD PRESSURE: 67 MMHG | OXYGEN SATURATION: 96 %

## 2020-11-15 DIAGNOSIS — Z96.622 PRESENCE OF LEFT ARTIFICIAL ELBOW JOINT: Chronic | ICD-10-CM

## 2020-11-15 DIAGNOSIS — Z20.828 CONTACT WITH AND (SUSPECTED) EXPOSURE TO OTHER VIRAL COMMUNICABLE DISEASES: ICD-10-CM

## 2020-11-15 PROCEDURE — 99283 EMERGENCY DEPT VISIT LOW MDM: CPT | Mod: CS

## 2020-11-15 NOTE — ED PROVIDER NOTE - OBJECTIVE STATEMENT
81 y/o F presents to ED requesting COVID19 testing.  Pt is asymptomatic and denies recent contact with confirmed or suspected person of COVID19 intact.  Pt denies fevers/chills, cough, chest pain, abd pain, n/v/d, known sick contacts or recent travels.

## 2020-11-15 NOTE — ED PROVIDER NOTE - NSFOLLOWUPINSTRUCTIONS_ED_ALL_ED_FT
THE COVID 19 TEST RESULTS  - results may take up to 2-3 days to become available   - if you have consented, you will receive your results electronically   -  you can check [facility] [employer] [facility] or call [phone] to discuss your results with our nursing staff    Please continue to self quarantine (home isolation) until your result is back and follow instructions accordingly  - positive: complete home isolation for a total of 14 days since day of testing and no more fever with feeling back to baseline   - negative: you will be able to stop home isolation but still practice standard precautions, similar to how you would manage a regular flu/cold.    Return to ER for any worsening symptoms, such as persistent fever >100.4F, shortness of breath, coughing up bloody sputum, chest pain, lethargy, and fainting    Please remember to wash your hands frequently (>20 seconds each time), avoid touching your face, and cover your cough/sneeze.  Always wear a mask when you are outside of your home and practice social distancing.    Only take Tylenol for fever/pain control and avoid NSAIDs (ibuprofen/Advil/Aleve/naproxen) due to potential increased risk of exacerbating COVID-19 infection

## 2020-11-15 NOTE — ED PROVIDER NOTE - PATIENT PORTAL LINK FT
You can access the FollowMyHealth Patient Portal offered by [facility] by registering at the following website: [email or web address]. By joining [employer]’s FollowMyHealth portal, you will also be able to view your health information using other applications (apps) compatible with our system.

## 2020-11-23 ENCOUNTER — RX RENEWAL (OUTPATIENT)
Age: 80
End: 2020-11-23

## 2020-12-28 ENCOUNTER — NON-APPOINTMENT (OUTPATIENT)
Age: 80
End: 2020-12-28

## 2021-04-27 ENCOUNTER — NON-APPOINTMENT (OUTPATIENT)
Age: 81
End: 2021-04-27

## 2021-04-27 ENCOUNTER — APPOINTMENT (OUTPATIENT)
Dept: INTERNAL MEDICINE | Facility: CLINIC | Age: 81
End: 2021-04-27
Payer: MEDICARE

## 2021-04-27 VITALS
SYSTOLIC BLOOD PRESSURE: 122 MMHG | WEIGHT: 149 LBS | OXYGEN SATURATION: 96 % | HEART RATE: 67 BPM | DIASTOLIC BLOOD PRESSURE: 68 MMHG | HEIGHT: 66 IN | TEMPERATURE: 98 F | BODY MASS INDEX: 23.95 KG/M2

## 2021-04-27 DIAGNOSIS — G89.29 PAIN IN RIGHT KNEE: ICD-10-CM

## 2021-04-27 DIAGNOSIS — R21 RASH AND OTHER NONSPECIFIC SKIN ERUPTION: ICD-10-CM

## 2021-04-27 DIAGNOSIS — M25.561 PAIN IN RIGHT KNEE: ICD-10-CM

## 2021-04-27 PROCEDURE — 36415 COLL VENOUS BLD VENIPUNCTURE: CPT

## 2021-04-27 PROCEDURE — G0439: CPT

## 2021-04-27 PROCEDURE — G0442 ANNUAL ALCOHOL SCREEN 15 MIN: CPT | Mod: 59

## 2021-04-27 PROCEDURE — 93000 ELECTROCARDIOGRAM COMPLETE: CPT | Mod: 59

## 2021-04-27 RX ORDER — MECLIZINE HYDROCHLORIDE 12.5 MG/1
12.5 TABLET ORAL 3 TIMES DAILY
Qty: 30 | Refills: 1 | Status: DISCONTINUED | COMMUNITY
Start: 2020-01-28 | End: 2021-04-27

## 2021-04-28 LAB
25(OH)D3 SERPL-MCNC: 39.4 NG/ML
ALBUMIN SERPL ELPH-MCNC: 4.2 G/DL
ALP BLD-CCNC: 71 U/L
ALT SERPL-CCNC: 24 U/L
ANION GAP SERPL CALC-SCNC: 11 MMOL/L
AST SERPL-CCNC: 27 U/L
BASOPHILS # BLD AUTO: 0.03 K/UL
BASOPHILS NFR BLD AUTO: 0.6 %
BILIRUB SERPL-MCNC: 0.4 MG/DL
BUN SERPL-MCNC: 18 MG/DL
CALCIUM SERPL-MCNC: 9.2 MG/DL
CHLORIDE SERPL-SCNC: 108 MMOL/L
CHOLEST SERPL-MCNC: 161 MG/DL
CO2 SERPL-SCNC: 24 MMOL/L
CREAT SERPL-MCNC: 0.72 MG/DL
EOSINOPHIL # BLD AUTO: 0.03 K/UL
EOSINOPHIL NFR BLD AUTO: 0.6 %
GLUCOSE SERPL-MCNC: 86 MG/DL
HCT VFR BLD CALC: 39.8 %
HDLC SERPL-MCNC: 78 MG/DL
HGB BLD-MCNC: 13.3 G/DL
IMM GRANULOCYTES NFR BLD AUTO: 0.2 %
LDLC SERPL CALC-MCNC: 46 MG/DL
LYMPHOCYTES # BLD AUTO: 1.4 K/UL
LYMPHOCYTES NFR BLD AUTO: 25.8 %
MAN DIFF?: NORMAL
MCHC RBC-ENTMCNC: 32.2 PG
MCHC RBC-ENTMCNC: 33.4 GM/DL
MCV RBC AUTO: 96.4 FL
MONOCYTES # BLD AUTO: 0.49 K/UL
MONOCYTES NFR BLD AUTO: 9 %
NEUTROPHILS # BLD AUTO: 3.47 K/UL
NEUTROPHILS NFR BLD AUTO: 63.8 %
NONHDLC SERPL-MCNC: 83 MG/DL
PLATELET # BLD AUTO: 184 K/UL
POTASSIUM SERPL-SCNC: 4.8 MMOL/L
PROT SERPL-MCNC: 6.2 G/DL
RBC # BLD: 4.13 M/UL
RBC # FLD: 12.9 %
SODIUM SERPL-SCNC: 142 MMOL/L
TRIGL SERPL-MCNC: 184 MG/DL
WBC # FLD AUTO: 5.43 K/UL

## 2021-04-29 PROBLEM — M25.561 CHRONIC PAIN OF RIGHT KNEE: Status: ACTIVE | Noted: 2019-01-24

## 2021-12-23 ENCOUNTER — NON-APPOINTMENT (OUTPATIENT)
Age: 81
End: 2021-12-23

## 2022-05-02 ENCOUNTER — RX RENEWAL (OUTPATIENT)
Age: 82
End: 2022-05-02

## 2022-06-14 ENCOUNTER — NON-APPOINTMENT (OUTPATIENT)
Age: 82
End: 2022-06-14

## 2022-06-14 ENCOUNTER — APPOINTMENT (OUTPATIENT)
Dept: INTERNAL MEDICINE | Facility: CLINIC | Age: 82
End: 2022-06-14
Payer: MEDICARE

## 2022-06-14 VITALS
TEMPERATURE: 97.7 F | BODY MASS INDEX: 23.14 KG/M2 | SYSTOLIC BLOOD PRESSURE: 136 MMHG | DIASTOLIC BLOOD PRESSURE: 75 MMHG | HEART RATE: 70 BPM | WEIGHT: 144 LBS | OXYGEN SATURATION: 97 % | HEIGHT: 66 IN

## 2022-06-14 DIAGNOSIS — R53.83 OTHER MALAISE: ICD-10-CM

## 2022-06-14 DIAGNOSIS — Z23 ENCOUNTER FOR IMMUNIZATION: ICD-10-CM

## 2022-06-14 DIAGNOSIS — R53.81 OTHER MALAISE: ICD-10-CM

## 2022-06-14 DIAGNOSIS — U07.1 COVID-19: ICD-10-CM

## 2022-06-14 PROCEDURE — G0439: CPT

## 2022-06-14 PROCEDURE — G0009: CPT

## 2022-06-14 PROCEDURE — 36415 COLL VENOUS BLD VENIPUNCTURE: CPT

## 2022-06-14 PROCEDURE — 90732 PPSV23 VACC 2 YRS+ SUBQ/IM: CPT

## 2022-06-14 PROCEDURE — G0442 ANNUAL ALCOHOL SCREEN 15 MIN: CPT | Mod: 59

## 2022-06-14 PROCEDURE — 93000 ELECTROCARDIOGRAM COMPLETE: CPT | Mod: 59

## 2022-06-14 PROCEDURE — G0444 DEPRESSION SCREEN ANNUAL: CPT

## 2022-06-16 LAB
25(OH)D3 SERPL-MCNC: 46.1 NG/ML
ALBUMIN SERPL ELPH-MCNC: 4.6 G/DL
ALP BLD-CCNC: 74 U/L
ALT SERPL-CCNC: 21 U/L
ANION GAP SERPL CALC-SCNC: 14 MMOL/L
AST SERPL-CCNC: 32 U/L
BASOPHILS # BLD AUTO: 0.01 K/UL
BASOPHILS NFR BLD AUTO: 0.2 %
BILIRUB SERPL-MCNC: 0.5 MG/DL
BUN SERPL-MCNC: 13 MG/DL
CALCIUM SERPL-MCNC: 9.5 MG/DL
CHLORIDE SERPL-SCNC: 103 MMOL/L
CHOLEST SERPL-MCNC: 165 MG/DL
CO2 SERPL-SCNC: 23 MMOL/L
CREAT SERPL-MCNC: 0.7 MG/DL
EGFR: 87 ML/MIN/1.73M2
EOSINOPHIL # BLD AUTO: 0.02 K/UL
EOSINOPHIL NFR BLD AUTO: 0.4 %
ESTIMATED AVERAGE GLUCOSE: 117 MG/DL
FOLATE SERPL-MCNC: >20 NG/ML
GLUCOSE SERPL-MCNC: 85 MG/DL
HBA1C MFR BLD HPLC: 5.7 %
HCT VFR BLD CALC: 41.7 %
HDLC SERPL-MCNC: 84 MG/DL
HGB BLD-MCNC: 13.4 G/DL
IMM GRANULOCYTES NFR BLD AUTO: 0 %
LDLC SERPL CALC-MCNC: 62 MG/DL
LYMPHOCYTES # BLD AUTO: 1.58 K/UL
LYMPHOCYTES NFR BLD AUTO: 30.6 %
MAN DIFF?: NORMAL
MCHC RBC-ENTMCNC: 31.1 PG
MCHC RBC-ENTMCNC: 32.1 GM/DL
MCV RBC AUTO: 96.8 FL
MONOCYTES # BLD AUTO: 0.48 K/UL
MONOCYTES NFR BLD AUTO: 9.3 %
NEUTROPHILS # BLD AUTO: 3.07 K/UL
NEUTROPHILS NFR BLD AUTO: 59.5 %
NONHDLC SERPL-MCNC: 81 MG/DL
PLATELET # BLD AUTO: 176 K/UL
POTASSIUM SERPL-SCNC: 4.5 MMOL/L
PROT SERPL-MCNC: 6.6 G/DL
RBC # BLD: 4.31 M/UL
RBC # FLD: 13.2 %
SODIUM SERPL-SCNC: 140 MMOL/L
TRIGL SERPL-MCNC: 99 MG/DL
TSH SERPL-ACNC: 2.15 UIU/ML
VIT B12 SERPL-MCNC: 328 PG/ML
WBC # FLD AUTO: 5.16 K/UL

## 2023-05-22 ENCOUNTER — RX RENEWAL (OUTPATIENT)
Age: 83
End: 2023-05-22

## 2023-06-05 ENCOUNTER — APPOINTMENT (OUTPATIENT)
Dept: INTERNAL MEDICINE | Facility: CLINIC | Age: 83
End: 2023-06-05

## 2023-06-14 ENCOUNTER — APPOINTMENT (OUTPATIENT)
Dept: INTERNAL MEDICINE | Facility: CLINIC | Age: 83
End: 2023-06-14
Payer: MEDICARE

## 2023-06-14 ENCOUNTER — NON-APPOINTMENT (OUTPATIENT)
Age: 83
End: 2023-06-14

## 2023-06-14 VITALS
WEIGHT: 134 LBS | BODY MASS INDEX: 21.63 KG/M2 | SYSTOLIC BLOOD PRESSURE: 122 MMHG | HEART RATE: 66 BPM | DIASTOLIC BLOOD PRESSURE: 65 MMHG | TEMPERATURE: 97.7 F | OXYGEN SATURATION: 98 %

## 2023-06-14 DIAGNOSIS — I83.93 ASYMPTOMATIC VARICOSE VEINS OF BILATERAL LOWER EXTREMITIES: ICD-10-CM

## 2023-06-14 DIAGNOSIS — R73.09 OTHER ABNORMAL GLUCOSE: ICD-10-CM

## 2023-06-14 DIAGNOSIS — M85.80 OTHER SPECIFIED DISORDERS OF BONE DENSITY AND STRUCTURE, UNSPECIFIED SITE: ICD-10-CM

## 2023-06-14 DIAGNOSIS — Z00.00 ENCOUNTER FOR GENERAL ADULT MEDICAL EXAMINATION W/OUT ABNORMAL FINDINGS: ICD-10-CM

## 2023-06-14 PROCEDURE — G0439: CPT

## 2023-06-14 PROCEDURE — 93000 ELECTROCARDIOGRAM COMPLETE: CPT

## 2023-06-14 PROCEDURE — 36415 COLL VENOUS BLD VENIPUNCTURE: CPT

## 2023-06-14 RX ORDER — CLOBETASOL PROPIONATE 0.5 MG/G
0.05 CREAM TOPICAL
Qty: 45 | Refills: 3 | Status: ACTIVE | COMMUNITY
Start: 2021-04-27 | End: 1900-01-01

## 2023-06-15 LAB
ALBUMIN SERPL ELPH-MCNC: 4.2 G/DL
ALP BLD-CCNC: 69 U/L
ALT SERPL-CCNC: 43 U/L
ANION GAP SERPL CALC-SCNC: 11 MMOL/L
AST SERPL-CCNC: 39 U/L
BILIRUB SERPL-MCNC: 0.4 MG/DL
BUN SERPL-MCNC: 14 MG/DL
CALCIUM SERPL-MCNC: 9.2 MG/DL
CHLORIDE SERPL-SCNC: 108 MMOL/L
CHOLEST SERPL-MCNC: 132 MG/DL
CO2 SERPL-SCNC: 26 MMOL/L
CREAT SERPL-MCNC: 0.77 MG/DL
EGFR: 77 ML/MIN/1.73M2
ESTIMATED AVERAGE GLUCOSE: 123 MG/DL
GLUCOSE SERPL-MCNC: 143 MG/DL
HBA1C MFR BLD HPLC: 5.9 %
HCT VFR BLD CALC: 38 %
HDLC SERPL-MCNC: 71 MG/DL
HGB BLD-MCNC: 12.5 G/DL
LDLC SERPL CALC-MCNC: 51 MG/DL
MCHC RBC-ENTMCNC: 31.3 PG
MCHC RBC-ENTMCNC: 32.9 GM/DL
MCV RBC AUTO: 95 FL
NONHDLC SERPL-MCNC: 61 MG/DL
PLATELET # BLD AUTO: 159 K/UL
POTASSIUM SERPL-SCNC: 3.9 MMOL/L
PROT SERPL-MCNC: 6 G/DL
RBC # BLD: 4 M/UL
RBC # FLD: 12.6 %
SODIUM SERPL-SCNC: 144 MMOL/L
TRIGL SERPL-MCNC: 53 MG/DL
WBC # FLD AUTO: 4.4 K/UL

## 2023-10-07 DIAGNOSIS — U07.1 COVID-19: ICD-10-CM

## 2023-11-07 ENCOUNTER — APPOINTMENT (OUTPATIENT)
Dept: INTERNAL MEDICINE | Facility: CLINIC | Age: 83
End: 2023-11-07
Payer: MEDICARE

## 2023-11-07 ENCOUNTER — APPOINTMENT (OUTPATIENT)
Dept: CT IMAGING | Facility: CLINIC | Age: 83
End: 2023-11-07
Payer: MEDICARE

## 2023-11-07 VITALS
OXYGEN SATURATION: 97 % | HEART RATE: 82 BPM | SYSTOLIC BLOOD PRESSURE: 112 MMHG | BODY MASS INDEX: 21.46 KG/M2 | TEMPERATURE: 97.3 F | DIASTOLIC BLOOD PRESSURE: 67 MMHG | WEIGHT: 132.94 LBS

## 2023-11-07 DIAGNOSIS — Z12.11 ENCOUNTER FOR SCREENING FOR MALIGNANT NEOPLASM OF COLON: ICD-10-CM

## 2023-11-07 DIAGNOSIS — R92.2 INCONCLUSIVE MAMMOGRAM: ICD-10-CM

## 2023-11-07 DIAGNOSIS — R92.30 INCONCLUSIVE MAMMOGRAM: ICD-10-CM

## 2023-11-07 DIAGNOSIS — R91.1 SOLITARY PULMONARY NODULE: ICD-10-CM

## 2023-11-07 DIAGNOSIS — Z12.39 ENCOUNTER FOR OTHER SCREENING FOR MALIGNANT NEOPLASM OF BREAST: ICD-10-CM

## 2023-11-07 DIAGNOSIS — R05.3 CHRONIC COUGH: ICD-10-CM

## 2023-11-07 PROCEDURE — 99214 OFFICE O/P EST MOD 30 MIN: CPT

## 2023-11-07 PROCEDURE — 71250 CT THORAX DX C-: CPT

## 2023-11-07 RX ORDER — NIRMATRELVIR AND RITONAVIR 300-100 MG
20 X 150 MG & KIT ORAL
Qty: 30 | Refills: 0 | Status: DISCONTINUED | COMMUNITY
Start: 2023-10-07 | End: 2023-11-07

## 2024-01-08 NOTE — PHYSICAL THERAPY INITIAL EVALUATION ADULT - FINE MOTOR COORDINATION, LEFT HAND, DIADOCHOKINESIS SKILLS, OT EVAL
Emergency Department TeleTriage Encounter Note      CHIEF COMPLAINT    Chief Complaint   Patient presents with    Rib Injury     C/o right sided rib pain x 3 hrs , worsens with movement and deep inspiration       VITAL SIGNS   Initial Vitals [01/08/24 1619]   BP Pulse Resp Temp SpO2   (!) 99/59 65 16 97.8 °F (36.6 °C) 97 %      MAP       --            ALLERGIES    Review of patient's allergies indicates:  No Known Allergies    PROVIDER TRIAGE NOTE  This is a teletriage evaluation of a 22 y.o. female presenting to the ED complaining of right sided rib pain starting today.  No trauma. Has had a cough and is currently on zithromax. Denies SOB.  Has taken motrin today.     Alert, no resp distress. Speaking in complete sentences.     Initial orders will be placed and care will be transferred to an alternate provider when patient is roomed for a full evaluation. Any additional orders and the final disposition will be determined by that provider.         ORDERS  Labs Reviewed - No data to display    ED Orders (720h ago, onward)      Start Ordered     Status Ordering Provider    01/08/24 1638 01/08/24 1637  X-Ray Chest PA And Lateral  1 time imaging         Ordered ESTELLE ARZOLA              Virtual Visit Note: The provider triage portion of this emergency department evaluation and documentation was performed via [employer], a HIPAA-compliant telemedicine application, in concert with a tele-presenter in the room. A face to face patient evaluation with one of my colleagues will occur once the patient is placed in an emergency department room.      DISCLAIMER: This note was prepared with [employer] voice recognition transcription software. Garbled syntax, mangled pronouns, and other bizarre constructions may be attributed to that software system.    
normal performance

## 2024-05-08 ENCOUNTER — APPOINTMENT (OUTPATIENT)
Dept: INTERNAL MEDICINE | Facility: CLINIC | Age: 84
End: 2024-05-08
Payer: MEDICARE

## 2024-05-08 DIAGNOSIS — E78.00 PURE HYPERCHOLESTEROLEMIA, UNSPECIFIED: ICD-10-CM
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
## 2024-05-08 PROCEDURE — 99443: CPT | Mod: 93

## 2024-05-08 RX ORDER — DOXYCYCLINE HYCLATE 100 MG/1
100 TABLET ORAL
Qty: 20 | Refills: 0 | Status: ACTIVE | COMMUNITY
Start: 2023-06-14 | End: 1900-01-01

## 2024-06-23 ENCOUNTER — RX RENEWAL (OUTPATIENT)
Age: 84
End: 2024-06-23

## 2024-06-23 RX ORDER — ATORVASTATIN CALCIUM 40 MG/1
40 TABLET, FILM COATED ORAL
Qty: 90 | Refills: 0 | Status: ACTIVE | COMMUNITY
Start: 2019-02-19 | End: 1900-01-01

## 2024-07-17 ENCOUNTER — APPOINTMENT (OUTPATIENT)
Dept: INTERNAL MEDICINE | Facility: CLINIC | Age: 84
End: 2024-07-17
Payer: MEDICARE

## 2024-07-17 ENCOUNTER — APPOINTMENT (OUTPATIENT)
Dept: RADIOLOGY | Facility: CLINIC | Age: 84
End: 2024-07-17

## 2024-07-17 ENCOUNTER — NON-APPOINTMENT (OUTPATIENT)
Age: 84
End: 2024-07-17

## 2024-07-17 VITALS
WEIGHT: 136.68 LBS | HEART RATE: 54 BPM | SYSTOLIC BLOOD PRESSURE: 110 MMHG | DIASTOLIC BLOOD PRESSURE: 64 MMHG | HEIGHT: 66 IN | OXYGEN SATURATION: 98 % | BODY MASS INDEX: 21.97 KG/M2 | TEMPERATURE: 98.1 F

## 2024-07-17 DIAGNOSIS — R06.2 WHEEZING: ICD-10-CM

## 2024-07-17 DIAGNOSIS — R91.1 SOLITARY PULMONARY NODULE: ICD-10-CM

## 2024-07-17 DIAGNOSIS — E78.00 PURE HYPERCHOLESTEROLEMIA, UNSPECIFIED: ICD-10-CM

## 2024-07-17 DIAGNOSIS — R73.09 OTHER ABNORMAL GLUCOSE: ICD-10-CM

## 2024-07-17 DIAGNOSIS — R53.83 OTHER MALAISE: ICD-10-CM

## 2024-07-17 DIAGNOSIS — R53.81 OTHER MALAISE: ICD-10-CM

## 2024-07-17 DIAGNOSIS — Z00.00 ENCOUNTER FOR GENERAL ADULT MEDICAL EXAMINATION W/OUT ABNORMAL FINDINGS: ICD-10-CM

## 2024-07-17 DIAGNOSIS — M85.80 OTHER SPECIFIED DISORDERS OF BONE DENSITY AND STRUCTURE, UNSPECIFIED SITE: ICD-10-CM

## 2024-07-17 DIAGNOSIS — Z12.11 ENCOUNTER FOR SCREENING FOR MALIGNANT NEOPLASM OF COLON: ICD-10-CM

## 2024-07-17 DIAGNOSIS — R05.3 CHRONIC COUGH: ICD-10-CM

## 2024-07-17 LAB
25(OH)D3 SERPL-MCNC: 31.8 NG/ML
ALBUMIN SERPL ELPH-MCNC: 4.3 G/DL
ALP BLD-CCNC: 65 U/L
ALT SERPL-CCNC: 31 U/L
ANION GAP SERPL CALC-SCNC: 11 MMOL/L
AST SERPL-CCNC: 35 U/L
BILIRUB SERPL-MCNC: 0.6 MG/DL
BUN SERPL-MCNC: 14 MG/DL
CALCIUM SERPL-MCNC: 9.2 MG/DL
CHLORIDE SERPL-SCNC: 106 MMOL/L
CHOLEST SERPL-MCNC: 158 MG/DL
CO2 SERPL-SCNC: 25 MMOL/L
CREAT SERPL-MCNC: 0.66 MG/DL
EGFR: 87 ML/MIN/1.73M2
ESTIMATED AVERAGE GLUCOSE: 117 MG/DL
FOLATE SERPL-MCNC: 15.2 NG/ML
GLUCOSE SERPL-MCNC: 108 MG/DL
HBA1C MFR BLD HPLC: 5.7 %
HCT VFR BLD CALC: 42.7 %
HDLC SERPL-MCNC: 78 MG/DL
HGB BLD-MCNC: 14.3 G/DL
LDLC SERPL CALC-MCNC: 63 MG/DL
MCHC RBC-ENTMCNC: 32 PG
MCHC RBC-ENTMCNC: 33.5 GM/DL
MCV RBC AUTO: 95.5 FL
NONHDLC SERPL-MCNC: 80 MG/DL
PLATELET # BLD AUTO: 198 K/UL
POTASSIUM SERPL-SCNC: 5 MMOL/L
PROT SERPL-MCNC: 6.4 G/DL
RBC # BLD: 4.47 M/UL
RBC # FLD: 13 %
SODIUM SERPL-SCNC: 142 MMOL/L
TRIGL SERPL-MCNC: 95 MG/DL
TSH SERPL-ACNC: 2.4 UIU/ML
VIT B12 SERPL-MCNC: 341 PG/ML
WBC # FLD AUTO: 5.92 K/UL

## 2024-07-17 PROCEDURE — 36415 COLL VENOUS BLD VENIPUNCTURE: CPT

## 2024-07-17 PROCEDURE — 71046 X-RAY EXAM CHEST 2 VIEWS: CPT

## 2024-07-17 PROCEDURE — 93000 ELECTROCARDIOGRAM COMPLETE: CPT

## 2024-07-17 PROCEDURE — G0439: CPT

## 2024-07-17 PROCEDURE — 77080 DXA BONE DENSITY AXIAL: CPT

## 2024-07-17 RX ORDER — ALBUTEROL SULFATE 90 UG/1
108 (90 BASE) INHALANT RESPIRATORY (INHALATION)
Qty: 1 | Refills: 5 | Status: ACTIVE | COMMUNITY
Start: 2024-07-17 | End: 1900-01-01

## 2024-09-06 NOTE — ED PROVIDER NOTE - CARE PLAN
Assessment and Plan:     Class 2 severe obesity with serious comorbidity and body mass index (BMI) of 37.0 to 37.9 in adult, unspecified obesity type (H)  Discussed increasing protein intake and limiting carbohydrate intake.  Encouraged strength training.  Recommend she does not eat out at restaurants more than 2 days/week.  Discussed treatment options.  Will start Wegovy, use as directed.  Educated on its indications and side effects.  Anticipate dose increase in 1 month.  - Semaglutide-Weight Management (WEGOVY) 0.25 MG/0.5ML pen  Dispense: 2 mL; Refill: 0    Mixed hyperlipidemia  She continues simvastatin.  She will follow-up for fasting physical in November.  - simvastatin (ZOCOR) 10 MG tablet  Dispense: 90 tablet; Refill: 2    Prediabetes  She has a history of prediabetes.  Will check A1c.  - Hemoglobin A1c  - Hemoglobin A1c    Elevated TSH  She is not currently taking medication.    -TSH    Insomnia, unspecified type  Discussed with sleep hygiene.  She continues zolpidem as needed.  - zolpidem (AMBIEN) 5 MG tablet  Dispense: 30 tablet; Refill: 0    Medication management  - Comprehensive metabolic panel  - Comprehensive metabolic panel        Subjective:     Jesusita is a 56 year old female presenting to the clinic for medication management and weight loss concerns.  Patient has a history of hyperlipidemia and is taking simvastatin 10 mg daily.  Last cholesterol check was on 11/10/2023 with a total cholesterol of 171, triglycerides 120, HDL 54, LDL 93.  She admits she is not consuming a healthy diet.  She walks the dog daily for exercise.  She does not consume alcohol.  Patient is taking zolpidem 5 mg as needed for insomnia.  Patient states she takes this sparingly.  She occasionally takes over-the-counter products instead. She has a history of prediabetes and is not currently taking medication.  She has a history of an elevated TSH and is not currently taking medication.  She is interested in a weight loss  treatment option.  She denies personal or family history of medullary thyroid cancer.  She denies history of pancreatitis.  Her  has been using Ozempic for diabetes and has been losing weight.      Reviewof Systems: A complete 14 point review of systems was obtained and is negative or as stated in the history of present illness.    Social History     Socioeconomic History    Marital status:      Spouse name: Not on file    Number of children: Not on file    Years of education: Not on file    Highest education level: Not on file   Occupational History    Not on file   Tobacco Use    Smoking status: Former     Types: Cigarettes     Passive exposure: Past    Smokeless tobacco: Never    Tobacco comments:     College    Vaping Use    Vaping status: Never Used   Substance and Sexual Activity    Alcohol use: Yes     Comment: one/month    Drug use: Never    Sexual activity: Not on file   Other Topics Concern    Not on file   Social History Narrative    Not on file     Social Determinants of Health     Financial Resource Strain: Low Risk  (9/6/2024)    Financial Resource Strain     Within the past 12 months, have you or your family members you live with been unable to get utilities (heat, electricity) when it was really needed?: No   Food Insecurity: Low Risk  (9/6/2024)    Food Insecurity     Within the past 12 months, did you worry that your food would run out before you got money to buy more?: No     Within the past 12 months, did the food you bought just not last and you didn t have money to get more?: No   Transportation Needs: Low Risk  (9/6/2024)    Transportation Needs     Within the past 12 months, has lack of transportation kept you from medical appointments, getting your medicines, non-medical meetings or appointments, work, or from getting things that you need?: No   Physical Activity: Unknown (9/6/2024)    Exercise Vital Sign     Days of Exercise per Week: 7 days     Minutes of Exercise per  "Session: Not on file   Stress: Stress Concern Present (9/6/2024)    [employer] - Occupational Stress Questionnaire     Feeling of Stress : Very much   Social Connections: Unknown (9/6/2024)    Social Connection and Isolation Panel [NHANES]     Frequency of Communication with Friends and Family: Not on file     Frequency of Social Gatherings with Friends and Family: Once a week     Attends [employer] Services: Not on file     Active Member of Clubs or Organizations: Not on file     Attends Club or Organization Meetings: Not on file     Marital Status: Not on file   Interpersonal Safety: Low Risk  (9/6/2024)    Interpersonal Safety     Do you feel physically and emotionally safe where you currently live?: Yes     Within the past 12 months, have you been hit, slapped, kicked or otherwise physically hurt by someone?: No     Within the past 12 months, have you been humiliated or emotionally abused in other ways by your partner or ex-partner?: No   Housing Stability: Low Risk  (9/6/2024)    Housing Stability     Do you have housing? : Yes     Are you worried about losing your housing?: No       Active Ambulatory Problems     Diagnosis Date Noted    Prediabetes 11/10/2023    Dry skin dermatitis 12/20/2022    Tinea pedis 12/26/2022    Class 2 severe obesity due to excess calories with serious comorbidity in adult (H) 09/06/2024     Resolved Ambulatory Problems     Diagnosis Date Noted    No Resolved Ambulatory Problems     No Additional Past Medical History       Family History   Problem Relation Age of Onset    Diabetes Mother     Hypertension Mother     Sleep Apnea Father     Macular Degeneration Father     Heart Failure Father        Objective:     /89   Pulse 71   Temp 97.6  F (36.4  C)   Resp 16   Ht 1.575 m (5' 2\")   Wt 93.4 kg (206 lb)   SpO2 95%   Breastfeeding No   BMI 37.68 kg/m      Patient is alert, in no obvious distress.   Skin: Warm, dry.    Neck: Supple, no " lymphadenopathy.  No thyromegaly.  Lungs:  Clear to auscultation. Respirations even and unlabored.  No wheezing or rales noted.   Heart:  Regular rate and rhythm.  No murmurs, S3, S4, gallops, or rubs.    Abdomen: Soft, nontender.  No organomegaly. Bowel sounds normoactive. No guarding or masses noted.                Principal Discharge DX:	Dizziness Principal Discharge DX:	Dizziness  Secondary Diagnosis:	Ataxia

## 2024-09-26 ENCOUNTER — APPOINTMENT (OUTPATIENT)
Dept: PULMONOLOGY | Facility: CLINIC | Age: 84
End: 2024-09-26
Payer: MEDICARE

## 2024-09-26 VITALS
BODY MASS INDEX: 21.69 KG/M2 | SYSTOLIC BLOOD PRESSURE: 120 MMHG | TEMPERATURE: 97.3 F | WEIGHT: 135 LBS | HEART RATE: 69 BPM | OXYGEN SATURATION: 96 % | HEIGHT: 66 IN | DIASTOLIC BLOOD PRESSURE: 70 MMHG

## 2024-09-26 DIAGNOSIS — R05.3 CHRONIC COUGH: ICD-10-CM

## 2024-09-26 PROCEDURE — 99204 OFFICE O/P NEW MOD 45 MIN: CPT | Mod: GC

## 2024-09-26 NOTE — PHYSICAL EXAM
[No Acute Distress] : no acute distress [Normal Oropharynx] : normal oropharynx [Normal Appearance] : normal appearance [Normal Rate/Rhythm] : normal rate/rhythm [Normal S1, S2] : normal s1, s2 [No Murmurs] : no murmurs [No Resp Distress] : no resp distress [Clear to Auscultation Bilaterally] : clear to auscultation bilaterally [No Abnormalities] : no abnormalities [No Edema] : no edema [Normal Color/ Pigmentation] : normal color/ pigmentation [Oriented x3] : oriented x3 [Normal Affect] : normal affect

## 2024-09-26 NOTE — HISTORY OF PRESENT ILLNESS
[Former] : former [TextBox_4] : 04/17/2019: Asked to evaluate patient by Dr Wilson for chronic cough. Coughs x years. Worst is 30 sec of coughing. May come after eating, or running in country. May produce a small amount of sputum. Reports having a barium swallow that was negative, CXRs. Cough present pretty much daily. No clear difference time of day or season. No real PND symptoms. No GERD sx. No dyspnea, runs regularly. No asthma. Smoked age 16 till 2004 breast cancer dx, < 1ppd. L breast cancer, surg, RT at [facility] [place].  9/25/2024 [Arnett]: Sent back by Dr. Wilson given some wheezes heard on exam last annual physical. Patient was started on prn albuterol inhaler but has not used it. She had CXR done in july 2024 and CT scan 11/2023 for her cough. she reports cough is not bothersome and occurs roughly 2-3 times a day for 2-5 seconds per episode. The cough is light, dry, and just slightly annoying. Denies post nasal drip symptoms, URTI symptoms or gerd sypmtoms. She eats 3+ hours before going to bed. She is not limited in her activity level (1-2 miles running 3-4 times a week, swimming 30 mins 1-2 times a week). Denies SOb during activity. No recent hospitalizations. interested in flu shot. former smoker, 30 years, <1 ppd. no weight loss of hemoptysis. [TextBox_11] : 0.8 [TextBox_13] : 30 [YearQuit] : 2000

## 2024-09-26 NOTE — ASSESSMENT
[FreeTextEntry1] : Data reviewed:  CT chest [facility] 11/2023 personally reviewed: mild increase in RUL nodules PA/lat CXR 7/2024 personally reviewed : clear  [place] 04/17/2019 : normal  Impression: Chronic cough with minimal airways disease on CT  Plan: The CT changes are quite minor. Repeat CT scan less than a year ago done w/ small area in the RUL w/ nonspecific ground glass nodules. She is not limited by sypmtoms or activity level. Given that there is no apparent significant underlying disease, she will tolerate the cough.  Can return back for new or worsening symptoms as needed.  -- Attending Addendum  Seen and examined by me and agree w above. Sent back for wheezing. The patient herself is without complaints. The cough is still present but improved from before. She is not wheezing on our exam today. Her scan does show minimal worsening of the mild RUL inflammatory changes but nothing needs to be done about this. Reassured. We are happy to see her back for any change in symptoms.

## 2024-09-27 ENCOUNTER — MED ADMIN CHARGE (OUTPATIENT)
Age: 84
End: 2024-09-27

## 2024-12-25 PROBLEM — F10.90 ALCOHOL USE: Status: ACTIVE | Noted: 2019-01-24

## 2025-01-15 ENCOUNTER — APPOINTMENT (OUTPATIENT)
Dept: INTERNAL MEDICINE | Facility: CLINIC | Age: 85
End: 2025-01-15
Payer: MEDICARE

## 2025-01-15 VITALS
SYSTOLIC BLOOD PRESSURE: 127 MMHG | TEMPERATURE: 98.1 F | OXYGEN SATURATION: 96 % | HEART RATE: 68 BPM | BODY MASS INDEX: 21.75 KG/M2 | WEIGHT: 135.33 LBS | HEIGHT: 66 IN | DIASTOLIC BLOOD PRESSURE: 58 MMHG

## 2025-01-15 DIAGNOSIS — R19.7 DIARRHEA, UNSPECIFIED: ICD-10-CM

## 2025-01-15 PROCEDURE — 99214 OFFICE O/P EST MOD 30 MIN: CPT | Mod: 25

## 2025-01-15 PROCEDURE — 36415 COLL VENOUS BLD VENIPUNCTURE: CPT

## 2025-01-16 LAB
ALBUMIN SERPL ELPH-MCNC: 4.4 G/DL
ALP BLD-CCNC: 63 U/L
ALT SERPL-CCNC: 36 U/L
ANION GAP SERPL CALC-SCNC: 11 MMOL/L
AST SERPL-CCNC: 26 U/L
BASOPHILS # BLD AUTO: 0.01 K/UL
BASOPHILS NFR BLD AUTO: 0.2 %
BILIRUB SERPL-MCNC: 0.4 MG/DL
BUN SERPL-MCNC: 19 MG/DL
CALCIUM SERPL-MCNC: 9.1 MG/DL
CHLORIDE SERPL-SCNC: 105 MMOL/L
CO2 SERPL-SCNC: 25 MMOL/L
CREAT SERPL-MCNC: 0.63 MG/DL
EGFR: 87 ML/MIN/1.73M2
EOSINOPHIL # BLD AUTO: 0.01 K/UL
EOSINOPHIL NFR BLD AUTO: 0.2 %
GLUCOSE SERPL-MCNC: 96 MG/DL
HCT VFR BLD CALC: 44.3 %
HGB BLD-MCNC: 14.3 G/DL
IMM GRANULOCYTES NFR BLD AUTO: 0.2 %
LYMPHOCYTES # BLD AUTO: 1.43 K/UL
LYMPHOCYTES NFR BLD AUTO: 27.1 %
MAN DIFF?: NORMAL
MCHC RBC-ENTMCNC: 31.5 PG
MCHC RBC-ENTMCNC: 32.3 G/DL
MCV RBC AUTO: 97.6 FL
MONOCYTES # BLD AUTO: 0.4 K/UL
MONOCYTES NFR BLD AUTO: 7.6 %
NEUTROPHILS # BLD AUTO: 3.41 K/UL
NEUTROPHILS NFR BLD AUTO: 64.7 %
PLATELET # BLD AUTO: 168 K/UL
POTASSIUM SERPL-SCNC: 4.4 MMOL/L
PROT SERPL-MCNC: 6.4 G/DL
RBC # BLD: 4.54 M/UL
RBC # FLD: 13.2 %
SODIUM SERPL-SCNC: 140 MMOL/L
WBC # FLD AUTO: 5.27 K/UL

## 2025-01-17 LAB — BACTERIA STL CULT: NORMAL

## 2025-01-19 PROBLEM — A04.72 C. DIFFICILE COLITIS: Status: ACTIVE | Noted: 2025-01-19

## 2025-01-19 LAB
C DIFF TOXIN B QL PCR REFLEX: NORMAL
DEPRECATED O AND P PREP STL: NORMAL
GDH ANTIGEN: DETECTED
TOXIN A AND B: DETECTED

## 2025-05-08 NOTE — PATIENT PROFILE ADULT. - NS SC CAGE ALCOHOL GUILTY ABOUT
Airway  Date/Time: 5/8/2025 7:44 AM  Reason: elective    Airway not difficult    Staffing  Performed: DORINA   Authorized by: Neal Steward MD    Performed by: DORINA Taylor  Patient location during procedure: OR    Patient Condition  Indications for airway management: anesthesia and airway protection  MILS maintained throughout  Sedation level: deep     Final Airway Details   Preoxygenated: yes  Final airway type: endotracheal airway  Successful airway: ETT  Cuffed: yes   Successful intubation technique: direct laryngoscopy  Adjuncts used in placement: cricoid pressure  Endotracheal tube insertion site: oral  Blade: So  Blade size: #4  ETT size (mm): 7.0  Cormack-Lehane Classification: grade I - full view of glottis  Placement verified by: chest auscultation and capnometry   Measured from: teeth  ETT to teeth (cm): 19  Number of attempts at approach: 1  Number of other approaches attempted: 0          
Peripheral Block    Patient location during procedure: pre-op  Medication administered at: 5/8/2025 7:08 AM  End time: 5/8/2025 7:18 AM  Reason for block: at surgeon's request and post-op pain management  Staffing  Performed: attending   Authorized by: Neal Steward MD    Performed by: Neal Steward MD  Preanesthetic Checklist     Timeout performed at: 5/8/2025 7:08 AM  Peripheral Block  Patient position: sitting  Prep: ChloraPrep  Patient monitoring: heart rate, cardiac monitor and continuous pulse ox  Block type: interscalene  Laterality: left  Injection technique: single-shot  Guidance: Doppler guided, nerve stimulator and ultrasound guided  Local infiltration: ropivacaine  Infiltration strength: 0.5 %  Dose: 25 mL  Needle  Needle type: short-bevel   Needle gauge: 21 G  Needle length: 8 cm  Needle localization: nerve stimulator and ultrasound guidance  Needle insertion depth: 4 cm  Test dose: negative  Assessment  Injection assessment: negative aspiration for heme, no paresthesia on injection, incremental injection and local visualized surrounding nerve on ultrasound  Heart rate change: no  Slow fractionated injection: yes  Additional Notes  Epi 1:200k and 40 mg depomedrol added to LA          
no

## 2025-07-08 ENCOUNTER — APPOINTMENT (OUTPATIENT)
Dept: INTERNAL MEDICINE | Facility: CLINIC | Age: 85
End: 2025-07-08
Payer: MEDICARE

## 2025-07-08 ENCOUNTER — LABORATORY RESULT (OUTPATIENT)
Age: 85
End: 2025-07-08

## 2025-07-08 VITALS
TEMPERATURE: 97.1 F | DIASTOLIC BLOOD PRESSURE: 80 MMHG | HEIGHT: 66 IN | OXYGEN SATURATION: 98 % | HEART RATE: 64 BPM | BODY MASS INDEX: 21.9 KG/M2 | SYSTOLIC BLOOD PRESSURE: 144 MMHG | WEIGHT: 136.24 LBS

## 2025-07-08 PROCEDURE — 99214 OFFICE O/P EST MOD 30 MIN: CPT

## 2025-07-08 PROCEDURE — 36415 COLL VENOUS BLD VENIPUNCTURE: CPT

## 2025-07-08 RX ORDER — DOXYCYCLINE HYCLATE 100 MG/1
100 TABLET, COATED ORAL
Qty: 2 | Refills: 10 | Status: ACTIVE | COMMUNITY
Start: 2025-07-08 | End: 1900-01-01

## 2025-07-09 LAB
ALBUMIN SERPL ELPH-MCNC: 4.3 G/DL
ALP BLD-CCNC: 102 U/L
ALT SERPL-CCNC: 42 U/L
ANION GAP SERPL CALC-SCNC: 15 MMOL/L
AST SERPL-CCNC: 37 U/L
BILIRUB SERPL-MCNC: 0.5 MG/DL
BUN SERPL-MCNC: 15 MG/DL
CALCIUM SERPL-MCNC: 9.8 MG/DL
CHLORIDE SERPL-SCNC: 106 MMOL/L
CHOLEST SERPL-MCNC: 140 MG/DL
CO2 SERPL-SCNC: 21 MMOL/L
CREAT SERPL-MCNC: 0.75 MG/DL
EGFRCR SERPLBLD CKD-EPI 2021: 78 ML/MIN/1.73M2
GLUCOSE SERPL-MCNC: 88 MG/DL
HDLC SERPL-MCNC: 64 MG/DL
LDLC SERPL-MCNC: 62 MG/DL
NONHDLC SERPL-MCNC: 76 MG/DL
POTASSIUM SERPL-SCNC: 4.6 MMOL/L
PROT SERPL-MCNC: 7 G/DL
SODIUM SERPL-SCNC: 141 MMOL/L
TRIGL SERPL-MCNC: 69 MG/DL

## 2025-07-14 LAB
A PHAGOCYTOPH IGG TITR SER IF: NORMAL
B BURGDOR AB SER QL IA: 8.15 IV
B MICROTI IGG TITR SER: NORMAL
E CHAFFEENSIS IGG TITR SER IF: NORMAL

## 2025-07-23 ENCOUNTER — APPOINTMENT (OUTPATIENT)
Dept: INTERNAL MEDICINE | Facility: CLINIC | Age: 85
End: 2025-07-23
Payer: MEDICARE

## 2025-07-23 VITALS
WEIGHT: 136.68 LBS | HEIGHT: 66 IN | OXYGEN SATURATION: 98 % | SYSTOLIC BLOOD PRESSURE: 128 MMHG | BODY MASS INDEX: 21.97 KG/M2 | TEMPERATURE: 97.8 F | DIASTOLIC BLOOD PRESSURE: 73 MMHG | HEART RATE: 66 BPM

## 2025-07-23 DIAGNOSIS — Z00.00 ENCOUNTER FOR GENERAL ADULT MEDICAL EXAMINATION W/OUT ABNORMAL FINDINGS: ICD-10-CM

## 2025-07-23 DIAGNOSIS — R73.09 OTHER ABNORMAL GLUCOSE: ICD-10-CM

## 2025-07-23 DIAGNOSIS — E78.00 PURE HYPERCHOLESTEROLEMIA, UNSPECIFIED: ICD-10-CM

## 2025-07-23 DIAGNOSIS — F41.9 ANXIETY DISORDER, UNSPECIFIED: ICD-10-CM

## 2025-07-23 DIAGNOSIS — M85.80 OTHER SPECIFIED DISORDERS OF BONE DENSITY AND STRUCTURE, UNSPECIFIED SITE: ICD-10-CM

## 2025-07-23 DIAGNOSIS — A69.20 LYME DISEASE, UNSPECIFIED: ICD-10-CM

## 2025-07-23 DIAGNOSIS — Z12.39 ENCOUNTER FOR OTHER SCREENING FOR MALIGNANT NEOPLASM OF BREAST: ICD-10-CM

## 2025-07-23 DIAGNOSIS — R91.1 SOLITARY PULMONARY NODULE: ICD-10-CM

## 2025-07-23 DIAGNOSIS — Z12.11 ENCOUNTER FOR SCREENING FOR MALIGNANT NEOPLASM OF COLON: ICD-10-CM

## 2025-07-23 PROCEDURE — 93000 ELECTROCARDIOGRAM COMPLETE: CPT

## 2025-07-23 PROCEDURE — G0439: CPT

## 2025-07-23 PROCEDURE — 36415 COLL VENOUS BLD VENIPUNCTURE: CPT

## 2025-07-23 RX ORDER — DOXYCYCLINE HYCLATE 100 MG/1
100 TABLET, COATED ORAL TWICE DAILY
Qty: 42 | Refills: 0 | Status: ACTIVE | COMMUNITY
Start: 2025-07-23 | End: 1900-01-01

## 2025-07-25 LAB
25(OH)D3 SERPL-MCNC: 30.7 NG/ML
ALBUMIN SERPL ELPH-MCNC: 4.6 G/DL
ALP BLD-CCNC: 68 U/L
ALT SERPL-CCNC: 43 U/L
ANION GAP SERPL CALC-SCNC: 14 MMOL/L
AST SERPL-CCNC: 37 U/L
BILIRUB SERPL-MCNC: 0.6 MG/DL
BUN SERPL-MCNC: 14 MG/DL
CALCIUM SERPL-MCNC: 9.7 MG/DL
CHLORIDE SERPL-SCNC: 104 MMOL/L
CHOLEST SERPL-MCNC: 168 MG/DL
CO2 SERPL-SCNC: 23 MMOL/L
CREAT SERPL-MCNC: 0.78 MG/DL
EGFRCR SERPLBLD CKD-EPI 2021: 75 ML/MIN/1.73M2
ESTIMATED AVERAGE GLUCOSE: 117 MG/DL
GLUCOSE SERPL-MCNC: 93 MG/DL
HBA1C MFR BLD HPLC: 5.7 %
HCT VFR BLD CALC: 42.1 %
HDLC SERPL-MCNC: 87 MG/DL
HGB BLD-MCNC: 13.6 G/DL
LDLC SERPL-MCNC: 66 MG/DL
MCHC RBC-ENTMCNC: 31.4 PG
MCHC RBC-ENTMCNC: 32.3 G/DL
MCV RBC AUTO: 97.2 FL
NONHDLC SERPL-MCNC: 82 MG/DL
PLATELET # BLD AUTO: 160 K/UL
POTASSIUM SERPL-SCNC: 4.7 MMOL/L
PROT SERPL-MCNC: 6.6 G/DL
RBC # BLD: 4.33 M/UL
RBC # FLD: 13.3 %
SODIUM SERPL-SCNC: 141 MMOL/L
TRIGL SERPL-MCNC: 84 MG/DL
WBC # FLD AUTO: 5.56 K/UL